# Patient Record
Sex: FEMALE | Race: WHITE | NOT HISPANIC OR LATINO
[De-identification: names, ages, dates, MRNs, and addresses within clinical notes are randomized per-mention and may not be internally consistent; named-entity substitution may affect disease eponyms.]

---

## 2017-02-11 ENCOUNTER — APPOINTMENT (OUTPATIENT)
Dept: OBGYN | Facility: CLINIC | Age: 42
End: 2017-02-11

## 2017-02-11 VITALS
SYSTOLIC BLOOD PRESSURE: 100 MMHG | DIASTOLIC BLOOD PRESSURE: 60 MMHG | BODY MASS INDEX: 21.51 KG/M2 | WEIGHT: 126 LBS | HEIGHT: 64 IN

## 2017-02-11 DIAGNOSIS — N88.3 INCOMPETENCE OF CERVIX UTERI: ICD-10-CM

## 2017-02-13 LAB — HPV HIGH+LOW RISK DNA PNL CVX: NEGATIVE

## 2017-02-16 LAB — CYTOLOGY CVX/VAG DOC THIN PREP: NORMAL

## 2017-02-28 ENCOUNTER — EMERGENCY (EMERGENCY)
Facility: HOSPITAL | Age: 42
LOS: 0 days | Discharge: HOME | End: 2017-02-28
Attending: EMERGENCY MEDICINE | Admitting: EMERGENCY MEDICINE
Payer: COMMERCIAL

## 2017-02-28 VITALS — HEIGHT: 64 IN | WEIGHT: 119.93 LBS

## 2017-02-28 VITALS
DIASTOLIC BLOOD PRESSURE: 72 MMHG | RESPIRATION RATE: 18 BRPM | TEMPERATURE: 98 F | OXYGEN SATURATION: 100 % | HEART RATE: 56 BPM | SYSTOLIC BLOOD PRESSURE: 123 MMHG

## 2017-02-28 DIAGNOSIS — S09.90XA UNSPECIFIED INJURY OF HEAD, INITIAL ENCOUNTER: ICD-10-CM
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
## 2017-02-28 DIAGNOSIS — S06.0X9A CONCUSSION WITH LOSS OF CONSCIOUSNESS OF UNSPECIFIED DURATION, INITIAL ENCOUNTER: ICD-10-CM
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
## 2017-02-28 PROCEDURE — 99284 EMERGENCY DEPT VISIT MOD MDM: CPT

## 2017-02-28 PROCEDURE — 70450 CT HEAD/BRAIN W/O DYE: CPT | Mod: 26

## 2017-02-28 NOTE — ED STATDOCS - OBJECTIVE STATEMENT
40 y/o F presents to ED c/o head injury from Wednesday (6 days ago) while skiing. Pt was wearing a helmet, fell onto her left side. Notes that she gets dizzy with minimal movement, worsening. Also notes photophobia and difficulty with visual focusing. Denies diplopia. Came to ED because sx have been worsening.

## 2017-02-28 NOTE — ED STATDOCS - ENMT, MLM
TM's clear; no excessive cerumen, no blood. Nasal mucosa clear.  Mouth with normal mucosa  Throat has no vesicles, no oropharyngeal exudates and uvula is midline.

## 2017-02-28 NOTE — ED STATDOCS - NS ED MD SCRIBE ATTENDING SCRIBE SECTIONS
HISTORY OF PRESENT ILLNESS/PHYSICAL EXAM/PROGRESS NOTE/CONSULTATIONS/SHIFT CHANGE/PAST MEDICAL/SURGICAL/SOCIAL HISTORY/DISPOSITION/RESULTS/REVIEW OF SYSTEMS

## 2017-02-28 NOTE — ED STATDOCS - CONSTITUTIONAL, MLM
normal... well appearing, well nourished, and in no apparent distress. Head is normocephalic, atraumatic.

## 2017-02-28 NOTE — ED STATDOCS - DETAILS:
I, Alma Rios, performed the initial face to face bedside interview with this patient regarding history of present illness, review of symptoms and relevant past medical, social and family history.  I completed an independent physical examination.  I was the initial provider who evaluated this patient. The history, relevant review of systems, past medical and surgical history, medical decision making, and physical examination was documented by the scribe in my presence and I attest to the accuracy of the documentation.

## 2018-02-24 ENCOUNTER — EMERGENCY (EMERGENCY)
Facility: HOSPITAL | Age: 43
LOS: 0 days | Discharge: HOME | End: 2018-02-24
Attending: EMERGENCY MEDICINE | Admitting: EMERGENCY MEDICINE
Payer: COMMERCIAL

## 2018-02-24 VITALS
RESPIRATION RATE: 17 BRPM | SYSTOLIC BLOOD PRESSURE: 121 MMHG | DIASTOLIC BLOOD PRESSURE: 76 MMHG | TEMPERATURE: 98 F | HEART RATE: 53 BPM | OXYGEN SATURATION: 100 %

## 2018-02-24 VITALS — HEIGHT: 64 IN | WEIGHT: 119.93 LBS
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
## 2018-02-24 DIAGNOSIS — F07.81 POSTCONCUSSIONAL SYNDROME: ICD-10-CM

## 2018-02-24 DIAGNOSIS — S09.90XA UNSPECIFIED INJURY OF HEAD, INITIAL ENCOUNTER: ICD-10-CM
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
## 2018-02-24 PROCEDURE — 99285 EMERGENCY DEPT VISIT HI MDM: CPT

## 2018-02-24 PROCEDURE — 70450 CT HEAD/BRAIN W/O DYE: CPT | Mod: 26

## 2018-02-24 RX ORDER — MECLIZINE HCL 12.5 MG
25 TABLET ORAL ONCE
Qty: 0 | Refills: 0 | Status: COMPLETED | OUTPATIENT
Start: 2018-02-24 | End: 2018-02-24

## 2018-02-24 RX ADMIN — Medication 25 MILLIGRAM(S): at 21:04

## 2018-02-24 NOTE — ED STATDOCS - PROGRESS NOTE DETAILS
CURTIS Farmer:   Patient has been seen, evaluated and orders have been written by the attending in intake. Patient is stable.  I will follow up the results of orders written and I will continue to evaluate/observe the patient.  closed head injury while skiing two days ago.  Now with dizziness, residual headache.  Plan for CT.  Probable post concussive syndrome.  Radha Farmer PA-C Patient is feeling much better, tests/labs reviewed. case discussed with attending. OK to dc home. CURTIS Salinas

## 2018-02-24 NOTE — ED ADULT NURSE NOTE - OBJECTIVE STATEMENT
Patient skiing 2 days ago fell on mountain struck head on ice patient was wearing a helmet and reports that the helmet had a dent.  Patient seen in [place] and checked for concussion but no CT done.  Patient reports feeling pressure on the sides of her head and dizziness.  patient reports momentary loss of consciousness when she fell.

## 2018-02-24 NOTE — ED STATDOCS - NEUROLOGICAL, MLM
sensation is normal and strength is normal. Cranial nerves intact. Gait is normal. Normal muscle strength. Clear speech. A&Ox3.

## 2018-02-24 NOTE — ED STATDOCS - OBJECTIVE STATEMENT
41 y/o female presents to the ED s/p fall while skiing two days ago. Pt states she was skiing down a steep hill, fell forward but hit the back of her head. Helmet was dented. No LOC. Was evaluated at a hospital in [place] but was discharged due to insurance reasons. Pt's  drove pt to [place] but no CT head was done. Pt was asymptomatic at the time. Woke up the next day with dizziness. +pain to site of injury. +HA described as pressure. +pain to back of eyes. No nausea or ear-ringing. No imbalance. No speech changes. Took Tylenol today. No chance of pregnancy.

## 2018-02-28 ENCOUNTER — APPOINTMENT (OUTPATIENT)
Dept: PHYSICAL MEDICINE AND REHAB | Facility: CLINIC | Age: 43
End: 2018-02-28
Payer: COMMERCIAL

## 2018-02-28 VITALS
HEART RATE: 52 BPM | DIASTOLIC BLOOD PRESSURE: 73 MMHG | WEIGHT: 120 LBS | BODY MASS INDEX: 20.49 KG/M2 | HEIGHT: 64 IN | OXYGEN SATURATION: 100 % | SYSTOLIC BLOOD PRESSURE: 124 MMHG | TEMPERATURE: 97.6 F

## 2018-02-28 DIAGNOSIS — G44.319 ACUTE POST-TRAUMATIC HEADACHE, NOT INTRACTABLE: ICD-10-CM

## 2018-02-28 DIAGNOSIS — R42 DIZZINESS AND GIDDINESS: ICD-10-CM

## 2018-02-28 DIAGNOSIS — H53.149 VISUAL DISCOMFORT, UNSPECIFIED: ICD-10-CM

## 2018-02-28 DIAGNOSIS — F40.298 OTHER SPECIFIED PHOBIA: ICD-10-CM

## 2018-02-28 DIAGNOSIS — S06.0X9A CONCUSSION WITH LOSS OF CONSCIOUSNESS OF UNSPECIFIED DURATION, INITIAL ENCOUNTER: ICD-10-CM

## 2018-02-28 DIAGNOSIS — R26.89 OTHER ABNORMALITIES OF GAIT AND MOBILITY: ICD-10-CM

## 2018-02-28 PROCEDURE — 99204 OFFICE O/P NEW MOD 45 MIN: CPT

## 2018-04-10 ENCOUNTER — APPOINTMENT (OUTPATIENT)
Age: 43
End: 2018-04-10

## 2019-02-28 ENCOUNTER — APPOINTMENT (OUTPATIENT)
Dept: OBGYN | Facility: CLINIC | Age: 44
End: 2019-02-28
Payer: COMMERCIAL

## 2019-02-28 VITALS
HEIGHT: 64 IN | BODY MASS INDEX: 21.68 KG/M2 | WEIGHT: 127 LBS | DIASTOLIC BLOOD PRESSURE: 66 MMHG | SYSTOLIC BLOOD PRESSURE: 100 MMHG

## 2019-02-28 DIAGNOSIS — N92.0 EXCESSIVE AND FREQUENT MENSTRUATION WITH REGULAR CYCLE: ICD-10-CM

## 2019-02-28 DIAGNOSIS — Z01.419 ENCOUNTER FOR GYNECOLOGICAL EXAMINATION (GENERAL) (ROUTINE) W/OUT ABNORMAL FINDINGS: ICD-10-CM

## 2019-02-28 DIAGNOSIS — R42 DIZZINESS AND GIDDINESS: ICD-10-CM

## 2019-02-28 PROCEDURE — 99396 PREV VISIT EST AGE 40-64: CPT

## 2019-02-28 PROCEDURE — 99214 OFFICE O/P EST MOD 30 MIN: CPT | Mod: 25

## 2019-02-28 NOTE — HISTORY OF PRESENT ILLNESS
[___ Year(s) Ago] : [unfilled] year(s) ago [Good] : being in good health [Healthy Diet] : a healthy diet [Regular Exercise] : regular exercise [Weight Concerns] : weight concens [Reproductive Age] : is of reproductive age [Contraception] : uses contraception [Sexually Active] : is sexually active [Monogamous] : is monogamous [Male ___] : [unfilled] male

## 2019-03-01 ENCOUNTER — TRANSCRIPTION ENCOUNTER (OUTPATIENT)
Age: 44
End: 2019-03-01

## 2019-03-02 LAB
BASOPHILS # BLD AUTO: 0.07 K/UL
BASOPHILS NFR BLD AUTO: 1 %
EOSINOPHIL # BLD AUTO: 0.18 K/UL
EOSINOPHIL NFR BLD AUTO: 2.7 %
HCT VFR BLD CALC: 43.2 %
HGB BLD-MCNC: 13 G/DL
HPV HIGH+LOW RISK DNA PNL CVX: NOT DETECTED
IMM GRANULOCYTES NFR BLD AUTO: 0.1 %
LYMPHOCYTES # BLD AUTO: 2.66 K/UL
LYMPHOCYTES NFR BLD AUTO: 39.5 %
MAN DIFF?: NORMAL
MCHC RBC-ENTMCNC: 29.1 PG
MCHC RBC-ENTMCNC: 30.1 GM/DL
MCV RBC AUTO: 96.9 FL
MONOCYTES # BLD AUTO: 0.46 K/UL
MONOCYTES NFR BLD AUTO: 6.8 %
NEUTROPHILS # BLD AUTO: 3.35 K/UL
NEUTROPHILS NFR BLD AUTO: 49.9 %
PLATELET # BLD AUTO: 217 K/UL
RBC # BLD: 4.46 M/UL
RBC # FLD: 14 %
WBC # FLD AUTO: 6.73 K/UL

## 2019-03-07 LAB — CYTOLOGY CVX/VAG DOC THIN PREP: NORMAL

## 2019-04-01 PROBLEM — M25.552 HIP PAIN, LEFT: Status: ACTIVE | Noted: 2019-04-01

## 2019-04-03 ENCOUNTER — APPOINTMENT (OUTPATIENT)
Dept: ORTHOPEDIC SURGERY | Facility: CLINIC | Age: 44
End: 2019-04-03
Payer: COMMERCIAL

## 2019-04-03 VITALS
BODY MASS INDEX: 21.17 KG/M2 | WEIGHT: 124 LBS | TEMPERATURE: 98.2 F | SYSTOLIC BLOOD PRESSURE: 115 MMHG | HEART RATE: 56 BPM | DIASTOLIC BLOOD PRESSURE: 73 MMHG | HEIGHT: 64 IN

## 2019-04-03 DIAGNOSIS — M25.552 PAIN IN LEFT HIP: ICD-10-CM

## 2019-04-03 DIAGNOSIS — Z78.9 OTHER SPECIFIED HEALTH STATUS: ICD-10-CM

## 2019-04-03 PROCEDURE — 73502 X-RAY EXAM HIP UNI 2-3 VIEWS: CPT | Mod: LT

## 2019-04-03 PROCEDURE — 99203 OFFICE O/P NEW LOW 30 MIN: CPT

## 2019-04-08 NOTE — ADDENDUM
[FreeTextEntry1] : This note was written by Nohemy White on 04/08/2019 acting as a scribe for FLY MONTEMAYOR

## 2019-04-08 NOTE — HISTORY OF PRESENT ILLNESS
[5] : the ailment interference is 5/10 [de-identified] : The patient comes in today with complaints to her left hip.  She is an avid runner and it has gotten to the point where she is having difficulty running, especially long distances.  The pain can get fairly high.  The patient states the onset/injury occurred on November 4, 2018.  This injury is not work related or due to an automobile accident.  The patient states the pain is intermittent.  The patient describes the pain as sharp and achy.  The patient notes rest makes her symptoms better, while running makes her symptoms worse.  The patient indicates pain is at a level of 5 on a pain scale of 0-10. [] : No

## 2019-04-08 NOTE — DISCUSSION/SUMMARY
[de-identified] : At this time, due to probable labral tear of left hip and because the severity of her complaints, I advised an anti-inflammatory.  I recommended she undergo an MRI.  We will discuss the potential for further intervention.

## 2019-04-08 NOTE — PHYSICAL EXAM
[de-identified] : Right Hip: \par Range of Motion in Degrees:\par 	                                 Claimant:	   Normal:	\par Flexion (Active) 	                 120 	   120-degrees	\par Flexion (Passive)	                 120	   120-degrees	\par Extension (Active)	                 -30	   -30-degrees	\par Extension (Passive)	 -30	   -30-degrees	\par Abduction (Active)	                 45-50	   31-52-[id number]	\par Abduction (Passive)	 45-50	   [id number]	\par Adduction (Active)  	 20-30	   97-91-jrulegr	\par Adduction (Passive)	 20-30	   33-08-[id number]	\par Internal Rotation (Active) 	 35	   35-degrees	\par Internal Rotation (Passive)	 35	   35-degrees	\par External Rotation (Active)	 45	   45-degrees	\par External Rotation (Passive)	 45	   45-degrees	\par \par No tenderness with internal or external rotation or axial load.  No tenderness to palpation over the greater trochanter.  Negative Trendelenburg.  No tenderness with resisted abduction.  No weakness to flexion, extension, abduction or adduction.  No evidence of instability.  No motor or sensory deficits.  2+ DP and PT pulses.  Skin is intact.  No scars, rashes or lesions.  \par \par Left Hip:\par Limited range of motion secondary to pain.  Tenderness with flexion, abduction and external rotation.  No motor or sensory deficits.  Skin is intact.  No scars, rashes or lesions.\par \par \par   [de-identified] : Ambulating with a slightly antalgic to antalgic gait.  Station:  Normal.  [de-identified] : Appearance:  Well-developed, well-nourished female in no acute distress.\par \par   [de-identified] : Radiographs, two to three views of the left hip, show no obvious osseous abnormality.

## 2020-04-29 NOTE — ED STATDOCS - ATTENDING CONTRIBUTION TO CARE
Hadley Blanco is a 37 y.o. female who was seen by synchronous (real-time) audio-video technology on 4/29/2020. Consent: Hadley Blanco, who was seen by synchronous (real-time) audio-video technology, and/or her healthcare decision maker, is aware that this patient-initiated, Telehealth encounter on 4/29/2020 is a billable service, with coverage as determined by her insurance carrier. She is aware that she may receive a bill and has provided verbal consent to proceed: Yes. Assessment & Plan:   Diagnoses and all orders for this visit:    1. Swelling of right eyelid    2. Anxiety    3. Stress          I spent at least 40 minutes on this visit with this established patient. ([id number])    Subjective:   Hadley Blanco is a 37 y.o. female who was seen for Swelling (pt states, no drainage, no eye pain, no blurred vision)    Saw patient via video for right eyelid swelling. Reports that it began almost 2 weeks  days ago and has not gotten any better. Reports that it feels like it has pressure behind the eye. Reports taking the antibiotics drops, but does not see any improvements. Denies any loss of vision or photosensitivity. Reports pain periodically. Denies loss of vision or headaches      Anxiety has gotten better. Has taken the ativan as needed      Denies fever, chills, CP, SOB      Prior to Admission medications    Medication Sig Start Date End Date Taking? Authorizing Provider   gentamicin (GARAMYCIN) 0.3 % ophthalmic solution Administer 2 Drops to right eye three (3) times daily. Administer 2 drops to right eye 3 times a day for 5 days 4/21/20  Yes Fernanda Lerma NP   LORazepam (ATIVAN) 0.5 mg tablet Take 0.5 Tabs by mouth every eight (8) hours as needed for Anxiety.  Max Daily Amount: 0.75 mg. 1/27/20  Yes Fernanda Lerma NP     No Known Allergies    Patient Active Problem List   Diagnosis Code    Thyromegaly E01.0    Thyroid nodule E04.1     Patient Active Problem List    Diagnosis Date Noted    Thyroid nodule 08/03/2017    Thyromegaly 08/29/2016     Current Outpatient Medications   Medication Sig Dispense Refill    gentamicin (GARAMYCIN) 0.3 % ophthalmic solution Administer 2 Drops to right eye three (3) times daily. Administer 2 drops to right eye 3 times a day for 5 days 1 Bottle 0    LORazepam (ATIVAN) 0.5 mg tablet Take 0.5 Tabs by mouth every eight (8) hours as needed for Anxiety. Max Daily Amount: 0.75 mg. 30 Tab 0     No Known Allergies  Past Medical History:   Diagnosis Date    Abnormal Pap smear     Follow-ups normal    Herpes simplex without mention of complication     HX OTHER MEDICAL     Endometriosus     Past Surgical History:   Procedure Laterality Date    HX OTHER SURGICAL  2005    Exploratory  laprascopic for endometriosus     Family History   Problem Relation Age of Onset    Cancer Maternal Grandmother         Breast    No Known Problems Mother     Arthritis-osteo Father     No Known Problems Sister     No Known Problems Brother     No Known Problems Sister      Social History     Tobacco Use    Smoking status: Never Smoker    Smokeless tobacco: Never Used   Substance Use Topics    Alcohol use: Yes     Alcohol/week: 0.0 standard drinks     Comment: Occasionally       Review of Systems   Constitutional: Negative for chills and fever. HENT: Negative. Eyes: Negative for blurred vision, double vision, pain and redness. Eyelid swelling    Respiratory: Negative. Cardiovascular: Negative. Gastrointestinal: Negative for nausea and vomiting. Genitourinary: Negative. Musculoskeletal: Negative. Skin: Negative. Neurological: Negative. Psychiatric/Behavioral: The patient is nervous/anxious.         Objective:   Vital Signs: (As obtained by patient/caregiver at home)  Visit Vitals  [facility] 04/21/2020        [INSTRUCTIONS:  \"[x]\" Indicates a positive item  \"[]\" Indicates a negative item  -- DELETE ALL ITEMS NOT EXAMINED]    Constitutional: [x] Appears well-developed and well-nourished [x] No apparent distress      [] Abnormal -     Mental status: [x] Alert and awake  [x] Oriented to person/place/time [x] Able to follow commands    [] Abnormal -     Eyes:   EOM    [x]  Normal    [x] Abnormal - eye swelling to the right eyelid, no apparent trauma or redness  Sclera  [x]  Normal    [] Abnormal -          Discharge [x]  None visible   [] Abnormal -     HENT: [x] Normocephalic, atraumatic  [] Abnormal -   [x] Mouth/Throat: Mucous membranes are moist    External Ears [x] Normal  [] Abnormal -    Neck: [x] No visualized mass [] Abnormal -     Pulmonary/Chest: [x] Respiratory effort normal   [x] No visualized signs of difficulty breathing or respiratory distress        [] Abnormal -      Musculoskeletal:   [x] Normal gait with no signs of ataxia         [x] Normal range of motion of neck        [] Abnormal -     Neurological:        [x] No Facial Asymmetry (Cranial nerve 7 motor function) (limited exam due to video visit)          [x] No gaze palsy        [] Abnormal -          Skin:        [x] No significant exanthematous lesions or discoloration noted on facial skin         [] Abnormal -            Psychiatric:       [x] Normal Affect [] Abnormal -        [x] No Hallucinations    Other pertinent observable physical exam findings:-        We discussed the expected course, resolution and complications of the diagnosis(es) in detail. Medication risks, benefits, costs, interactions, and alternatives were discussed as indicated. I advised her to contact the office if her condition worsens, changes or fails to improve as anticipated. She expressed understanding with the diagnosis(es) and plan. Oh Borges is a 37 y.o. female who was evaluated by a video visit encounter for concerns as above. Patient identification was verified prior to start of the visit. A caregiver was present when appropriate.  Due to this being a TeleHealth encounter (During RYLAJ-78 public health emergency), evaluation of the following organ systems was limited: Vitals/Constitutional/EENT/Resp/CV/GI//MS/Neuro/Skin/Heme-Lymph-Imm. Pursuant to the emergency declaration under the [facility], Counts include 234 beds at the [facility] waiver authority and the [employer] Act, this Virtual  Visit was conducted, with patient's (and/or legal guardian's) consent, to reduce the patient's risk of exposure to COVID-19 and provide necessary medical care. Services were provided through a video synchronous discussion virtually to substitute for in-person clinic visit. Patient and provider were located at their individual homes. I spent at least 30 minutes with this patient.  Called optometry to get recommendations and make a tele visit     Kristal Sanchez NP Attending Contribution to Care: I, Joslyn Wood, performed the initial face to face bedside interview with this patient regarding history of present illness, review of symptoms and relevant past medical, social and family history.  I completed an independent physical examination.  I was the initial provider who evaluated this patient. I have signed out the follow up of any pending tests (i.e. labs, radiological studies) to the ACP.  I have communicated the patient’s plan of care and disposition with the ACP.

## 2020-12-21 PROBLEM — Z01.419 ENCOUNTER FOR GYNECOLOGICAL EXAMINATION: Status: RESOLVED | Noted: 2017-02-11 | Resolved: 2020-12-21

## 2021-01-04 ENCOUNTER — EMERGENCY (EMERGENCY)
Facility: HOSPITAL | Age: 46
LOS: 0 days | Discharge: HOME | End: 2021-01-04
Attending: EMERGENCY MEDICINE
Payer: COMMERCIAL

## 2021-01-04 VITALS
DIASTOLIC BLOOD PRESSURE: 71 MMHG | HEART RATE: 59 BPM | SYSTOLIC BLOOD PRESSURE: 116 MMHG | TEMPERATURE: 98 F | RESPIRATION RATE: 18 BRPM | OXYGEN SATURATION: 100 %

## 2021-01-04 VITALS — HEIGHT: 64 IN | WEIGHT: 115.96 LBS

## 2021-01-04 DIAGNOSIS — R30.0 DYSURIA: ICD-10-CM

## 2021-01-04 DIAGNOSIS — N39.0 URINARY TRACT INFECTION, SITE NOT SPECIFIED: ICD-10-CM

## 2021-01-04 LAB
APPEARANCE UR: ABNORMAL
BILIRUB UR-MCNC: NEGATIVE — SIGNIFICANT CHANGE UP
COLOR SPEC: SIGNIFICANT CHANGE UP
DIFF PNL FLD: ABNORMAL
GLUCOSE UR QL: NEGATIVE MG/DL — SIGNIFICANT CHANGE UP
KETONES UR-MCNC: ABNORMAL
LEUKOCYTE ESTERASE UR-ACNC: ABNORMAL
NITRITE UR-MCNC: NEGATIVE — SIGNIFICANT CHANGE UP
PH UR: 6.5 — SIGNIFICANT CHANGE UP (ref 5–8)
PROT UR-MCNC: 100 MG/DL
SP GR SPEC: 1.01 — SIGNIFICANT CHANGE UP (ref 1.01–1.02)
UROBILINOGEN FLD QL: NEGATIVE MG/DL — SIGNIFICANT CHANGE UP

## 2021-01-04 PROCEDURE — 87086 URINE CULTURE/COLONY COUNT: CPT

## 2021-01-04 PROCEDURE — 99284 EMERGENCY DEPT VISIT MOD MDM: CPT

## 2021-01-04 PROCEDURE — 87186 SC STD MICRODIL/AGAR DIL: CPT

## 2021-01-04 PROCEDURE — 81001 URINALYSIS AUTO W/SCOPE: CPT

## 2021-01-04 PROCEDURE — 99283 EMERGENCY DEPT VISIT LOW MDM: CPT

## 2021-01-04 RX ORDER — CEPHALEXIN 500 MG
1 CAPSULE ORAL
Qty: 14 | Refills: 0
Start: 2021-01-04 | End: 2021-01-10

## 2021-01-04 RX ORDER — PHENAZOPYRIDINE HCL 100 MG
1 TABLET ORAL
Qty: 6 | Refills: 0
Start: 2021-01-04

## 2021-01-04 RX ORDER — CEPHALEXIN 500 MG
500 CAPSULE ORAL ONCE
Refills: 0 | Status: COMPLETED | OUTPATIENT
Start: 2021-01-04 | End: 2021-01-04

## 2021-01-04 RX ORDER — PHENAZOPYRIDINE HCL 100 MG
100 TABLET ORAL ONCE
Refills: 0 | Status: COMPLETED | OUTPATIENT
Start: 2021-01-04 | End: 2021-01-04

## 2021-01-04 RX ADMIN — Medication 500 MILLIGRAM(S): at 22:15

## 2021-01-04 RX ADMIN — Medication 100 MILLIGRAM(S): at 22:15

## 2021-01-04 NOTE — ED STATDOCS - PROGRESS NOTE DETAILS
patient seen and evaluated.  +UTI.  reviewed management, return precautions, PMD f/u -Flores Renteria PA-C

## 2021-01-04 NOTE — ED STATDOCS - OBJECTIVE STATEMENT
44 y/o female with no significant PMHx presents to the ED c/o dysuria x3 days. Pt reports urinary frequency, symptoms worsening yesterday, and episode of hematuria today. Denies fever, back pain. Notes Hx of hematuria and UTI in the past. No other complaints at this time. NKDA.

## 2021-01-04 NOTE — ED STATDOCS - NS_ ATTENDINGSCRIBEDETAILS _ED_A_ED_FT
I, Oneil Peace MD,  performed the initial face to face bedside interview with this patient regarding history of present illness, review of symptoms and relevant past medical, social and family history.  I completed an independent physical examination.    The history, relevant review of systems, past medical and surgical history, medical decision making, and physical examination was documented by the scribe in my presence and I attest to the accuracy of the documentation.

## 2021-01-04 NOTE — ED STATDOCS - PATIENT PORTAL LINK FT
You can access the FollowMyHealth Patient Portal offered by [facility] by registering at the following website: [email or web address]. By joining [employer]’s FollowMyHealth portal, you will also be able to view your health information using other applications (apps) compatible with our system.

## 2021-01-04 NOTE — ED STATDOCS - MDM ORDERS SUBMITTED SELECTION
Labs/Medications Quality 226: Preventive Care And Screening: Tobacco Use: Screening And Cessation Intervention: Patient screened for tobacco and never smoked Quality 431: Preventive Care And Screening: Unhealthy Alcohol Use - Screening: Patient screened for unhealthy alcohol use using a single question and scores 2 or greater episodes per year and brief intervention occurred Quality 110: Preventive Care And Screening: Influenza Immunization: Influenza Immunization previously received during influenza season Detail Level: Detailed Quality 111:Pneumonia Vaccination Status For Older Adults: Pneumococcal Vaccination not Administered or Previously Received, Reason not Otherwise Specified

## 2021-02-17 ENCOUNTER — EMERGENCY (EMERGENCY)
Facility: HOSPITAL | Age: 46
LOS: 0 days | Discharge: HOME | End: 2021-02-17
Attending: EMERGENCY MEDICINE
Payer: COMMERCIAL

## 2021-02-17 VITALS
OXYGEN SATURATION: 100 % | SYSTOLIC BLOOD PRESSURE: 124 MMHG | RESPIRATION RATE: 17 BRPM | DIASTOLIC BLOOD PRESSURE: 84 MMHG | HEART RATE: 70 BPM | TEMPERATURE: 98 F

## 2021-02-17 VITALS — WEIGHT: 115.08 LBS | HEIGHT: 64 IN

## 2021-02-17 DIAGNOSIS — R11.0 NAUSEA: ICD-10-CM

## 2021-02-17 DIAGNOSIS — R42 DIZZINESS AND GIDDINESS: ICD-10-CM

## 2021-02-17 DIAGNOSIS — R51.9 HEADACHE, UNSPECIFIED: ICD-10-CM

## 2021-02-17 LAB
ALBUMIN SERPL ELPH-MCNC: 3.9 G/DL — SIGNIFICANT CHANGE UP (ref 3.3–5)
ALP SERPL-CCNC: 87 U/L — SIGNIFICANT CHANGE UP (ref 40–120)
ALT FLD-CCNC: 25 U/L — SIGNIFICANT CHANGE UP (ref 12–78)
ANION GAP SERPL CALC-SCNC: 6 MMOL/L — SIGNIFICANT CHANGE UP (ref 5–17)
AST SERPL-CCNC: 22 U/L — SIGNIFICANT CHANGE UP (ref 15–37)
BASOPHILS # BLD AUTO: 0.07 K/UL — SIGNIFICANT CHANGE UP (ref 0–0.2)
BASOPHILS NFR BLD AUTO: 0.6 % — SIGNIFICANT CHANGE UP (ref 0–2)
BILIRUB SERPL-MCNC: 0.2 MG/DL — SIGNIFICANT CHANGE UP (ref 0.2–1.2)
BUN SERPL-MCNC: 19 MG/DL — SIGNIFICANT CHANGE UP (ref 7–23)
CALCIUM SERPL-MCNC: 9 MG/DL — SIGNIFICANT CHANGE UP (ref 8.5–10.1)
CHLORIDE SERPL-SCNC: 110 MMOL/L — HIGH (ref 96–108)
CO2 SERPL-SCNC: 27 MMOL/L — SIGNIFICANT CHANGE UP (ref 22–31)
CREAT SERPL-MCNC: 0.72 MG/DL — SIGNIFICANT CHANGE UP (ref 0.5–1.3)
EOSINOPHIL # BLD AUTO: 0.19 K/UL — SIGNIFICANT CHANGE UP (ref 0–0.5)
EOSINOPHIL NFR BLD AUTO: 1.7 % — SIGNIFICANT CHANGE UP (ref 0–6)
GLUCOSE SERPL-MCNC: 95 MG/DL — SIGNIFICANT CHANGE UP (ref 70–99)
HCT VFR BLD CALC: 40.2 % — SIGNIFICANT CHANGE UP (ref 34.5–45)
HGB BLD-MCNC: 12.9 G/DL — SIGNIFICANT CHANGE UP (ref 11.5–15.5)
IMM GRANULOCYTES NFR BLD AUTO: 0.2 % — SIGNIFICANT CHANGE UP (ref 0–1.5)
LYMPHOCYTES # BLD AUTO: 27.9 % — SIGNIFICANT CHANGE UP (ref 13–44)
LYMPHOCYTES # BLD AUTO: 3.21 K/UL — SIGNIFICANT CHANGE UP (ref 1–3.3)
MCHC RBC-ENTMCNC: 27.7 PG — SIGNIFICANT CHANGE UP (ref 27–34)
MCHC RBC-ENTMCNC: 32.1 GM/DL — SIGNIFICANT CHANGE UP (ref 32–36)
MCV RBC AUTO: 86.3 FL — SIGNIFICANT CHANGE UP (ref 80–100)
MONOCYTES # BLD AUTO: 0.78 K/UL — SIGNIFICANT CHANGE UP (ref 0–0.9)
MONOCYTES NFR BLD AUTO: 6.8 % — SIGNIFICANT CHANGE UP (ref 2–14)
NEUTROPHILS # BLD AUTO: 7.24 K/UL — SIGNIFICANT CHANGE UP (ref 1.8–7.4)
NEUTROPHILS NFR BLD AUTO: 62.8 % — SIGNIFICANT CHANGE UP (ref 43–77)
PLATELET # BLD AUTO: 231 K/UL — SIGNIFICANT CHANGE UP (ref 150–400)
POTASSIUM SERPL-MCNC: 4 MMOL/L — SIGNIFICANT CHANGE UP (ref 3.5–5.3)
POTASSIUM SERPL-SCNC: 4 MMOL/L — SIGNIFICANT CHANGE UP (ref 3.5–5.3)
PROT SERPL-MCNC: 6.9 GM/DL — SIGNIFICANT CHANGE UP (ref 6–8.3)
RBC # BLD: 4.66 M/UL — SIGNIFICANT CHANGE UP (ref 3.8–5.2)
RBC # FLD: 14.3 % — SIGNIFICANT CHANGE UP (ref 10.3–14.5)
SODIUM SERPL-SCNC: 143 MMOL/L — SIGNIFICANT CHANGE UP (ref 135–145)
WBC # BLD: 11.51 K/UL — HIGH (ref 3.8–10.5)
WBC # FLD AUTO: 11.51 K/UL — HIGH (ref 3.8–10.5)

## 2021-02-17 PROCEDURE — 70450 CT HEAD/BRAIN W/O DYE: CPT

## 2021-02-17 PROCEDURE — 99284 EMERGENCY DEPT VISIT MOD MDM: CPT | Mod: 25

## 2021-02-17 PROCEDURE — 99284 EMERGENCY DEPT VISIT MOD MDM: CPT

## 2021-02-17 PROCEDURE — 80053 COMPREHEN METABOLIC PANEL: CPT

## 2021-02-17 PROCEDURE — 85025 COMPLETE CBC W/AUTO DIFF WBC: CPT

## 2021-02-17 PROCEDURE — 36415 COLL VENOUS BLD VENIPUNCTURE: CPT

## 2021-02-17 PROCEDURE — 70450 CT HEAD/BRAIN W/O DYE: CPT | Mod: 26

## 2021-02-17 PROCEDURE — 96374 THER/PROPH/DIAG INJ IV PUSH: CPT

## 2021-02-17 RX ORDER — MECLIZINE HCL 12.5 MG
1 TABLET ORAL
Qty: 30 | Refills: 0
Start: 2021-02-17 | End: 2021-02-26

## 2021-02-17 RX ORDER — MECLIZINE HCL 12.5 MG
25 TABLET ORAL ONCE
Refills: 0 | Status: COMPLETED | OUTPATIENT
Start: 2021-02-17 | End: 2021-02-17

## 2021-02-17 RX ORDER — SODIUM CHLORIDE 9 MG/ML
1000 INJECTION INTRAMUSCULAR; INTRAVENOUS; SUBCUTANEOUS ONCE
Refills: 0 | Status: COMPLETED | OUTPATIENT
Start: 2021-02-17 | End: 2021-02-17

## 2021-02-17 RX ORDER — KETOROLAC TROMETHAMINE 30 MG/ML
30 SYRINGE (ML) INJECTION ONCE
Refills: 0 | Status: DISCONTINUED | OUTPATIENT
Start: 2021-02-17 | End: 2021-02-17

## 2021-02-17 RX ORDER — ACETAMINOPHEN 500 MG
650 TABLET ORAL ONCE
Refills: 0 | Status: COMPLETED | OUTPATIENT
Start: 2021-02-17 | End: 2021-02-17

## 2021-02-17 RX ADMIN — Medication 25 MILLIGRAM(S): at 18:50

## 2021-02-17 RX ADMIN — Medication 30 MILLIGRAM(S): at 19:53

## 2021-02-17 RX ADMIN — Medication 650 MILLIGRAM(S): at 18:50

## 2021-02-17 RX ADMIN — SODIUM CHLORIDE 1000 MILLILITER(S): 9 INJECTION INTRAMUSCULAR; INTRAVENOUS; SUBCUTANEOUS at 18:50

## 2021-02-17 NOTE — ED STATDOCS - NEUROLOGICAL, MLM
sensation is normal and strength is normal. sensation is normal and strength is normal. Ambulates well, no ataxia.

## 2021-02-17 NOTE — ED STATDOCS - PATIENT PORTAL LINK FT
You can access the FollowMyHealth Patient Portal offered by [facility] by registering at the following website: [email or web address]. By joining [employer]’s FollowMyHealth portal, you will also be able to view your health information using other applications (apps) compatible with our system.

## 2021-02-17 NOTE — ED ADULT TRIAGE NOTE - CHIEF COMPLAINT QUOTE
left sided headache and dizziness x 12 days. denies fall or injury. + intermittent nausea and light sensitivity. Denies vomiting, fever/chills. hx concussion.

## 2021-02-17 NOTE — ED ADULT NURSE NOTE - OBJECTIVE STATEMENT
Pt came with with c/o left sided headache that started about two weeks ago. Pt states it started when she was running. Pt states pain is 4/10 at rest and 7/10 with activity. Pain is associated with photophobia and dizziness. Denies LOC, head trauma, nausea or vomiting.

## 2021-02-17 NOTE — ED STATDOCS - PROGRESS NOTE DETAILS
labs WNL, CT head with no acute findings, pt feeling much better will d/c home with meclizine outpt f/uw ith PMD and ENT pt agreeable to d/c and plan of care, all questions answered, return precautions given  Jade Lepe PA-C

## 2021-12-04 ENCOUNTER — EMERGENCY (EMERGENCY)
Facility: HOSPITAL | Age: 46
LOS: 0 days | Discharge: HOME | End: 2021-12-04
Attending: EMERGENCY MEDICINE
Payer: COMMERCIAL

## 2021-12-04 VITALS — HEART RATE: 55 BPM | SYSTOLIC BLOOD PRESSURE: 117 MMHG | DIASTOLIC BLOOD PRESSURE: 71 MMHG

## 2021-12-04 VITALS — HEIGHT: 64 IN | WEIGHT: 115.08 LBS

## 2021-12-04 DIAGNOSIS — R09.81 NASAL CONGESTION: ICD-10-CM

## 2021-12-04 DIAGNOSIS — R53.83 OTHER FATIGUE: ICD-10-CM

## 2021-12-04 DIAGNOSIS — B97.89 OTHER VIRAL AGENTS AS THE CAUSE OF DISEASES CLASSIFIED ELSEWHERE: ICD-10-CM

## 2021-12-04 DIAGNOSIS — J06.9 ACUTE UPPER RESPIRATORY INFECTION, UNSPECIFIED: ICD-10-CM

## 2021-12-04 DIAGNOSIS — R06.7 SNEEZING: ICD-10-CM

## 2021-12-04 PROCEDURE — U0005: CPT

## 2021-12-04 PROCEDURE — 99284 EMERGENCY DEPT VISIT MOD MDM: CPT

## 2021-12-04 PROCEDURE — 99282 EMERGENCY DEPT VISIT SF MDM: CPT

## 2021-12-04 PROCEDURE — U0003: CPT

## 2021-12-04 NOTE — ED ADULT TRIAGE NOTE - CHIEF COMPLAINT QUOTE
pt presents to ED with c/o COVID exposure, coworker tested +. pt last saw sick contact on Tuesday. pt endorses sneezing, congestion, chills, lightheadedness. pt denies loss of taste or smell. unsure if she is afebrile, no at home temperatures taken. pt denies SOB or CP. SaO2 100% in triage. no respiratory distress noted.

## 2021-12-04 NOTE — ED STATDOCS - CARE PROVIDER_API CALL
Elli Sow  [facility]  [address], [address]  [place] [address]  Phone: [phone]  Fax: [phone]  Follow Up Time:

## 2021-12-04 NOTE — ED STATDOCS - PROGRESS NOTE DETAILS
Patient seen and evaluated in intake with ED Attending,  ED attending note and orders reviewed, will continue with patient follow up and care -Deepika Brantley PA-C

## 2021-12-04 NOTE — ED STATDOCS - ATTENDING CONTRIBUTION TO CARE
Attending Contribution to Care: I, Joslyn Wood, performed the initial face to face bedside interview with this patient regarding history of present illness, review of symptoms and relevant past medical, social and family history.  I completed an independent physical examination.  I was the initial provider who evaluated this patient. I have signed out the follow up of any pending tests (i.e. labs, radiological studies) to the ACP.  I have communicated the patient’s plan of care and disposition with the ACP.

## 2021-12-04 NOTE — ED STATDOCS - NSFOLLOWUPINSTRUCTIONS_ED_ALL_ED_FT
Take tylenol 650mg every 4-6 hours as needed for fever, pain, headache.   Keep hydrated.  Rest.  Isolate until you have your Covid results.     Viral Respiratory Infection  A viral respiratory infection is an illness that affects parts of the body used for breathing, like the lungs, nose, and throat. It is caused by a germ called a virus.    ImageSome examples of this kind of infection are:    A cold.  The flu (influenza).  A respiratory syncytial virus (RSV) infection.    How do I know if I have this infection?  Most of the time this infection causes:    A stuffy or runny nose.  Yellow or green fluid in the nose.  A cough.  Sneezing.  Tiredness (fatigue).  Achy muscles.  A sore throat.  Sweating or chills.  A fever.  A headache.    How is this infection treated?  If the flu is diagnosed early, it may be treated with an antiviral medicine. This medicine shortens the length of time a person has symptoms. Symptoms may be treated with over-the-counter and prescription medicines, such as:    Expectorants. These make it easier to cough up mucus.  Decongestant nasal sprays.    Doctors do not prescribe antibiotic medicines for viral infections. They do not work with this kind of infection.    How do I know if I should stay home?  To keep others from getting sick, stay home if you have:    A fever.  A lasting cough.  A sore throat.  A runny nose.  Sneezing.  Muscles aches.  Headaches.  Tiredness.  Weakness.  Chills.  Sweating.  An upset stomach (nausea).    Follow these instructions at home:  Rest as much as possible.  Take over-the-counter and prescription medicines only as told by your doctor.  Drink enough fluid to keep your pee (urine) clear or pale yellow.  Gargle with salt water. Do this 3–4 times per day or as needed. To make a salt–water mixture, dissolve ½–1 tsp of salt in 1 cup of warm water. Make sure the salt dissolves all the way.  Use nose drops made from salt water. This helps with stuffiness (congestion). It also helps soften the skin around your nose.  Do not drink alcohol.  Do not use tobacco products, including cigarettes, chewing tobacco, and e-cigarettes. If you need help quitting, ask your doctor.  Get help if:  Your symptoms last for 10 days or longer.  Your symptoms get worse over time.  You have a fever.  You have very bad pain in your face or forehead.  Parts of your jaw or neck become very swollen.  Get help right away if:  You feel pain or pressure in your chest.  You have shortness of breath.  You faint or feel like you will faint.  You keep throwing up (vomiting).  You feel confused.  This information is not intended to replace advice given to you by your health care provider. Make sure you discuss any questions you have with your health care provider.

## 2021-12-04 NOTE — ED STATDOCS - CLINICAL SUMMARY MEDICAL DECISION MAKING FREE TEXT BOX
Young healthy adult female with URI; likely viral. Covid PCR to be sent.  Patient to isolate and will treat symptoms with tylenol and mucinex at home.

## 2021-12-04 NOTE — ED STATDOCS - ENMT, MLM
Nasal mucosa clear.  Mouth with normal mucosa  Throat has no vesicles, no oropharyngeal exudates and uvula is midline. clear mucus in nose, erythema in distal portion of nose. Mouth with normal mucosa  Throat has no vesicles, no oropharyngeal exudates and uvula is midline.

## 2021-12-04 NOTE — ED STATDOCS - PATIENT PORTAL LINK FT
You can access the FollowMyHealth Patient Portal offered by [facility] by registering at the following website: [email or web address]. By joining [employer]’s FollowMyHealth portal, you will also be able to view your health information using other applications (apps) compatible with our system.

## 2021-12-04 NOTE — ED ADULT NURSE NOTE - OBJECTIVE STATEMENT
46 year old male with no pertinent PMHx presents with sneezing, nasal congestion, and fatigue x last night. Pt states that her coworker who sits behind her tested positive for COVID 5 days ago. She last interacted with that worker 5-6 days ago. No other sick contacts at home. Pt has to run a race in the morning and is afraid of exposing other people. Pt came to request covid testing. Denies fever. Took Mucinex today with some relief. Has a pulse ox at home but hasn't used it yet. COVID vaccinated Pfizer in 1/2021 and 2/2021. Denies chest pain or SOB. No other injuries or complaints.

## 2021-12-04 NOTE — ED STATDOCS - OBJECTIVE STATEMENT
46 year old male with no pertinent PMHx 46 year old male with no pertinent PMHx presents with sneezing, nasal congestion, and fatigue x last night. Pt states that her coworker who sits behind her tested positive for COVID 5 days ago. She last interacted with that worker 5-6 days ago. No other sick contacts at home. Pt has to run a race in the morning and is afraid of exposing other people. Pt came to request covid testing. Denies fever. Took Mucinex today with some relief. Has a pulse ox at home but hasn't used it yet. COVID vaccinated Pfizer in 1/2021 and 2/2021. Denies chest pain or SOB. No other injuries or complaints.

## 2021-12-04 NOTE — ED ADULT NURSE NOTE - NS ED NURSE LEVEL OF CONSCIOUSNESS AFFECT
No Show Note/Documentation    Patient: Yamel Shah  Date of Session: 11/27/2019  Diagnosis:   1. Pharyngoesophageal dysphagia       MRN: [id number]    Yamel Shah did not attend her scheduled therapy appointment today. She did not call to cancel nor reschedule. This is the 1st appointment that she has not attended. Next appointment is scheduled for 12/2/19 and will follow up with patient at that time. No charges have been posted today.       SONIA Sellers., CCC-SLP, CBIS  Speech-Language Pathologist  11/27/2019     Calm/Appropriate

## 2021-12-05 LAB — SARS-COV-2 RNA SPEC QL NAA+PROBE: SIGNIFICANT CHANGE UP

## 2021-12-12 ENCOUNTER — INPATIENT (INPATIENT)
Facility: HOSPITAL | Age: 46
LOS: 0 days | Discharge: HOME | DRG: 316 | End: 2021-12-13
Attending: HOSPITALIST | Admitting: INTERNAL MEDICINE
Payer: COMMERCIAL

## 2021-12-12 VITALS — WEIGHT: 115.08 LBS | HEIGHT: 64 IN

## 2021-12-12 DIAGNOSIS — R07.9 CHEST PAIN, UNSPECIFIED: ICD-10-CM

## 2021-12-12 LAB
ALBUMIN SERPL ELPH-MCNC: 3.8 G/DL — SIGNIFICANT CHANGE UP (ref 3.3–5)
ALP SERPL-CCNC: 80 U/L — SIGNIFICANT CHANGE UP (ref 40–120)
ALT FLD-CCNC: 46 U/L — SIGNIFICANT CHANGE UP (ref 12–78)
ANION GAP SERPL CALC-SCNC: 5 MMOL/L — SIGNIFICANT CHANGE UP (ref 5–17)
APPEARANCE UR: CLEAR — SIGNIFICANT CHANGE UP
APTT BLD: 29.7 SEC — SIGNIFICANT CHANGE UP (ref 27.5–35.5)
AST SERPL-CCNC: 42 U/L — HIGH (ref 15–37)
BACTERIA # UR AUTO: ABNORMAL
BASOPHILS # BLD AUTO: 0.07 K/UL — SIGNIFICANT CHANGE UP (ref 0–0.2)
BASOPHILS NFR BLD AUTO: 0.9 % — SIGNIFICANT CHANGE UP (ref 0–2)
BILIRUB SERPL-MCNC: 0.3 MG/DL — SIGNIFICANT CHANGE UP (ref 0.2–1.2)
BILIRUB UR-MCNC: NEGATIVE — SIGNIFICANT CHANGE UP
BLD GP AB SCN SERPL QL: SIGNIFICANT CHANGE UP
BUN SERPL-MCNC: 16 MG/DL — SIGNIFICANT CHANGE UP (ref 7–23)
CALCIUM SERPL-MCNC: 8.6 MG/DL — SIGNIFICANT CHANGE UP (ref 8.5–10.1)
CHLORIDE SERPL-SCNC: 110 MMOL/L — HIGH (ref 96–108)
CO2 SERPL-SCNC: 27 MMOL/L — SIGNIFICANT CHANGE UP (ref 22–31)
COLOR SPEC: YELLOW — SIGNIFICANT CHANGE UP
CREAT SERPL-MCNC: 0.74 MG/DL — SIGNIFICANT CHANGE UP (ref 0.5–1.3)
DIFF PNL FLD: ABNORMAL
EOSINOPHIL # BLD AUTO: 0.13 K/UL — SIGNIFICANT CHANGE UP (ref 0–0.5)
EOSINOPHIL NFR BLD AUTO: 1.6 % — SIGNIFICANT CHANGE UP (ref 0–6)
EPI CELLS # UR: SIGNIFICANT CHANGE UP
GLUCOSE SERPL-MCNC: 104 MG/DL — HIGH (ref 70–99)
GLUCOSE UR QL: NEGATIVE MG/DL — SIGNIFICANT CHANGE UP
HCG SERPL-ACNC: <1 MIU/ML — SIGNIFICANT CHANGE UP
HCT VFR BLD CALC: 37.6 % — SIGNIFICANT CHANGE UP (ref 34.5–45)
HGB BLD-MCNC: 12 G/DL — SIGNIFICANT CHANGE UP (ref 11.5–15.5)
IMM GRANULOCYTES NFR BLD AUTO: 0.1 % — SIGNIFICANT CHANGE UP (ref 0–1.5)
INR BLD: 0.95 RATIO — SIGNIFICANT CHANGE UP (ref 0.88–1.16)
KETONES UR-MCNC: NEGATIVE — SIGNIFICANT CHANGE UP
LEUKOCYTE ESTERASE UR-ACNC: NEGATIVE — SIGNIFICANT CHANGE UP
LIDOCAIN IGE QN: 101 U/L — SIGNIFICANT CHANGE UP (ref 73–393)
LYMPHOCYTES # BLD AUTO: 2.65 K/UL — SIGNIFICANT CHANGE UP (ref 1–3.3)
LYMPHOCYTES # BLD AUTO: 33.2 % — SIGNIFICANT CHANGE UP (ref 13–44)
MCHC RBC-ENTMCNC: 28.2 PG — SIGNIFICANT CHANGE UP (ref 27–34)
MCHC RBC-ENTMCNC: 31.9 GM/DL — LOW (ref 32–36)
MCV RBC AUTO: 88.5 FL — SIGNIFICANT CHANGE UP (ref 80–100)
MONOCYTES # BLD AUTO: 0.47 K/UL — SIGNIFICANT CHANGE UP (ref 0–0.9)
MONOCYTES NFR BLD AUTO: 5.9 % — SIGNIFICANT CHANGE UP (ref 2–14)
NEUTROPHILS # BLD AUTO: 4.65 K/UL — SIGNIFICANT CHANGE UP (ref 1.8–7.4)
NEUTROPHILS NFR BLD AUTO: 58.3 % — SIGNIFICANT CHANGE UP (ref 43–77)
NITRITE UR-MCNC: NEGATIVE — SIGNIFICANT CHANGE UP
PH UR: 6 — SIGNIFICANT CHANGE UP (ref 5–8)
PLATELET # BLD AUTO: 210 K/UL — SIGNIFICANT CHANGE UP (ref 150–400)
POTASSIUM SERPL-MCNC: 4 MMOL/L — SIGNIFICANT CHANGE UP (ref 3.5–5.3)
POTASSIUM SERPL-SCNC: 4 MMOL/L — SIGNIFICANT CHANGE UP (ref 3.5–5.3)
PROT SERPL-MCNC: 6.8 GM/DL — SIGNIFICANT CHANGE UP (ref 6–8.3)
PROT UR-MCNC: NEGATIVE MG/DL — SIGNIFICANT CHANGE UP
PROTHROM AB SERPL-ACNC: 11.1 SEC — SIGNIFICANT CHANGE UP (ref 10.6–13.6)
RBC # BLD: 4.25 M/UL — SIGNIFICANT CHANGE UP (ref 3.8–5.2)
RBC # FLD: 14.6 % — HIGH (ref 10.3–14.5)
RBC CASTS # UR COMP ASSIST: SIGNIFICANT CHANGE UP /HPF (ref 0–4)
SODIUM SERPL-SCNC: 142 MMOL/L — SIGNIFICANT CHANGE UP (ref 135–145)
SP GR SPEC: 1.01 — SIGNIFICANT CHANGE UP (ref 1.01–1.02)
TROPONIN I, HIGH SENSITIVITY RESULT: 105.78 NG/L — HIGH
TROPONIN I, HIGH SENSITIVITY RESULT: 99.9 NG/L — HIGH
UROBILINOGEN FLD QL: NEGATIVE MG/DL — SIGNIFICANT CHANGE UP
WBC # BLD: 7.98 K/UL — SIGNIFICANT CHANGE UP (ref 3.8–10.5)
WBC # FLD AUTO: 7.98 K/UL — SIGNIFICANT CHANGE UP (ref 3.8–10.5)
WBC UR QL: SIGNIFICANT CHANGE UP

## 2021-12-12 PROCEDURE — 71101 X-RAY EXAM UNILAT RIBS/CHEST: CPT | Mod: 26,LT

## 2021-12-12 PROCEDURE — 85025 COMPLETE CBC W/AUTO DIFF WBC: CPT

## 2021-12-12 PROCEDURE — 84484 ASSAY OF TROPONIN QUANT: CPT

## 2021-12-12 PROCEDURE — 84443 ASSAY THYROID STIM HORMONE: CPT

## 2021-12-12 PROCEDURE — 80053 COMPREHEN METABOLIC PANEL: CPT

## 2021-12-12 PROCEDURE — 85730 THROMBOPLASTIN TIME PARTIAL: CPT

## 2021-12-12 PROCEDURE — 99222 1ST HOSP IP/OBS MODERATE 55: CPT

## 2021-12-12 PROCEDURE — 36415 COLL VENOUS BLD VENIPUNCTURE: CPT

## 2021-12-12 PROCEDURE — 93010 ELECTROCARDIOGRAM REPORT: CPT

## 2021-12-12 PROCEDURE — 86617 LYME DISEASE ANTIBODY: CPT

## 2021-12-12 PROCEDURE — 80061 LIPID PANEL: CPT

## 2021-12-12 PROCEDURE — 93306 TTE W/DOPPLER COMPLETE: CPT

## 2021-12-12 PROCEDURE — 86140 C-REACTIVE PROTEIN: CPT

## 2021-12-12 PROCEDURE — 85610 PROTHROMBIN TIME: CPT

## 2021-12-12 PROCEDURE — 85652 RBC SED RATE AUTOMATED: CPT

## 2021-12-12 PROCEDURE — 71260 CT THORAX DX C+: CPT | Mod: 26,MA

## 2021-12-12 PROCEDURE — 74177 CT ABD & PELVIS W/CONTRAST: CPT | Mod: 26,MA

## 2021-12-12 PROCEDURE — G0378: CPT

## 2021-12-12 PROCEDURE — 86618 LYME DISEASE ANTIBODY: CPT

## 2021-12-12 PROCEDURE — 83036 HEMOGLOBIN GLYCOSYLATED A1C: CPT

## 2021-12-12 PROCEDURE — 99285 EMERGENCY DEPT VISIT HI MDM: CPT

## 2021-12-12 RX ORDER — SODIUM CHLORIDE 9 MG/ML
1000 INJECTION INTRAMUSCULAR; INTRAVENOUS; SUBCUTANEOUS ONCE
Refills: 0 | Status: COMPLETED | OUTPATIENT
Start: 2021-12-12 | End: 2021-12-12

## 2021-12-12 RX ORDER — ACETAMINOPHEN 500 MG
650 TABLET ORAL EVERY 6 HOURS
Refills: 0 | Status: DISCONTINUED | OUTPATIENT
Start: 2021-12-12 | End: 2021-12-12

## 2021-12-12 RX ORDER — ACETAMINOPHEN 500 MG
650 TABLET ORAL EVERY 6 HOURS
Refills: 0 | Status: DISCONTINUED | OUTPATIENT
Start: 2021-12-12 | End: 2021-12-13

## 2021-12-12 RX ORDER — LIDOCAINE 4 G/100G
1 CREAM TOPICAL ONCE
Refills: 0 | Status: COMPLETED | OUTPATIENT
Start: 2021-12-12 | End: 2021-12-12

## 2021-12-12 RX ORDER — ACETAMINOPHEN 500 MG
650 TABLET ORAL ONCE
Refills: 0 | Status: COMPLETED | OUTPATIENT
Start: 2021-12-12 | End: 2021-12-12

## 2021-12-12 RX ORDER — ASPIRIN/CALCIUM CARB/MAGNESIUM 324 MG
324 TABLET ORAL ONCE
Refills: 0 | Status: COMPLETED | OUTPATIENT
Start: 2021-12-12 | End: 2021-12-12

## 2021-12-12 RX ADMIN — Medication 500 MILLIGRAM(S): at 17:06

## 2021-12-12 RX ADMIN — Medication 650 MILLIGRAM(S): at 17:06

## 2021-12-12 RX ADMIN — LIDOCAINE 1 PATCH: 4 CREAM TOPICAL at 18:26

## 2021-12-12 RX ADMIN — Medication 324 MILLIGRAM(S): at 22:08

## 2021-12-12 RX ADMIN — SODIUM CHLORIDE 1000 MILLILITER(S): 9 INJECTION INTRAMUSCULAR; INTRAVENOUS; SUBCUTANEOUS at 18:26

## 2021-12-12 NOTE — ED STATDOCS - OBJECTIVE STATEMENT
45 y/o F with no significant PMHx  presents to the ED c/o L sided CP / L sided rib pain radiating to the back  in the setting of a trip and fall over the curb 1 week ago.  Pt states that she does not think she hit her head. No LOC. States that after the fall she felt very dizzy and sat on a bench. Took Motrin today around 11 AM today with mild relief. Pt states that she felt nauseous this morning. No vomiting.  NKDA.

## 2021-12-12 NOTE — ED ADULT TRIAGE NOTE - CHIEF COMPLAINT QUOTE
c/o left chest pain and rib pain s/p trip and fall 6 days ago, c/o left elbow injury, patient reports exacerbating pain today while leaning on elbow, pain worse today, took motrin today  HX: denies

## 2021-12-12 NOTE — H&P ADULT - ASSESSMENT
45 y/o F w/ PMH of bradycardia, p/w CP.    *Chest pain w/ elevated troponins   -Possibly cardiac contusion?  -Trend Troponins  -ASA  -Cardio consult  -Tele monitoring  -Echo     *Bradycardia  -Cardio consult  -Tele monitoring  -Check TSH / Lyme panel     *Mild non-specific periportal edema  -F/u outpatient GI     *DVT ppx  -SCDs  45 y/o F w/ PMH of bradycardia, p/w CP.    *Chest pain w/ elevated troponins   -Possibly cardiac contusion?  -Trend Troponins  -ASA  -Cardio consult  -Tele monitoring  -Echo   -EKG reviewed by Dr. Bradshaw     *Bradycardia  -Cardio consult  -Tele monitoring  -Check TSH / Lyme panel     *Mild non-specific periportal edema  -F/u outpatient GI     *DVT ppx  -SCDs     IMPROVE VTE Individual Risk Assessment    RISK                                                                Points    [  ] Previous VTE                                                  3    [  ] Thrombophilia                                               2    [  ] Lower limb paralysis                                      2        (unable to hold up >15 seconds)      [  ] Current Cancer                                              2         (within 6 months)    [  ] Immobilization > 24 hrs                                1    [  ] ICU/CCU stay > 24 hours                              1    [  ] Age > 60                                                      1    IMPROVE VTE Score _____0____    IMPROVE Score 0-1: Low Risk, No VTE prophylaxis required for most patients, encourage ambulation.   IMPROVE Score 2-3: At risk, pharmacologic VTE prophylaxis is indicated for most patients (in the absence of a contraindication)  IMPROVE Score > or = 4: High Risk, pharmacologic VTE prophylaxis is indicated for most patients (in the absence of a contraindication)

## 2021-12-12 NOTE — ED STATDOCS - ATTENDING CONTRIBUTION TO CARE
I, Salvatore Hickey DO, personally saw the patient with ACP.  I have personally performed a face to face diagnostic evaluation on this patient and formulated the patient plan. The case was discussed with, and handed off to ACP who followed the case through to the re-evaluation and disposition.

## 2021-12-12 NOTE — ED STATDOCS - PROGRESS NOTE DETAILS
Chest X-Ray reviewed with Dr. Drew. Labs drawn CT Chest and CT abd./pelvis for further evaluation of findings on chest X-ray. Usama RAY 46 yr. old female presents to ED with pain in left side of chest radiating to back after trip and fall while running on Monday. No head injury or LOC. Felt dizzy after fall and sat down on a bench. pain in left side worsens upon deep breathing and movement. Seen and examined by attending in Intake. Plan: Chest X-ray and  X-Ray of ribs.  Will F/U with results and re evaluate. Usama NP KV: case discussed with trauma service, not felt to be from trauma / need trauma admission at this time. KV: ekg reviewed by Dr. Bradshaw- paul ST elevations and t wave inversion not felt to represent acute infarction. Recommending trending of troponin. Troponin trending up, will plan for admission.

## 2021-12-12 NOTE — H&P ADULT - HISTORY OF PRESENT ILLNESS
47 y/o F w/ PMH of bradycardia, p/w CP. Patient had a fall about 1 week ago. States that she forgot to take a mask, so she went back to the car and was going back to the store when she tripped over the curb and fell. She hit the L side of her body and had bruising on lateral side of abdomen (which she says has resolved) and had pain in her L shoulder / L upper chest area. States pain radiated to back as well. Since the fall she has continued to run, and during a run at [facility], patient states the L upper chest area had sharp pain in it. Yesterday when she tried to push off of a seat with LUE, she also had pain in that area. Patient states she did not have any dizziness / lightheadedness before the fall, but after the fall she felt dizziness momentarily as she was trying to her bearings. Denies cough, runny nose, sore throat, nausea, vomiting, abdominal pain, fever, chills     PSH:  x 2    Social Hx: Denies tobacco / drugs, Etoh - 2 drinks per week    Family Hx: Father - kidney problems

## 2021-12-12 NOTE — ED STATDOCS - NS ED ROS FT
Constitutional: No fever.  Eyes: No vision changes.   Ears, Nose, Mouth, Throat: No congestion.  Cardiovascular: L sided chest pain.  Respiratory: No difficulty breathing.  Gastrointestinal: No nausea . No vomiting.  Genitourinary: No dysuria.  Musculoskeletal: No joint pain.  Neurological: No headache.  Integumentary (skin and/or breast): No rash.

## 2021-12-12 NOTE — ED STATDOCS - PHYSICAL EXAMINATION
Vital signs as available reviewed.  General:  Comfortable, no acute distress.  Head:  Normocephalic, atraumatic.  Eyes:  Conjunctiva pink, no icterus. Pupils equal, round, reactive to light, extra-occular eye movements intact.  Cardiovascular:  Regular rate, no obvious murmur.  Respiratory:  Clear to auscultation, good air entry bilaterally.  Abdomen:  Soft, non-tender.  Musculoskeletal:  No tenderness to palpation over c/t/l spine. No tenderness to palpation over clavicles, shoulders, upper/lower arms, hip/pelvis, upper / lower legs. L upper / mid thoracic rib TTP , no ecchymosis  Neurologic: Alert and oriented, moving all extremities. Sensation intact.  Skin:  Warm and dry. No skin break. Vital signs as available reviewed.  General:  Comfortable, no acute distress.  Head:  Normocephalic, atraumatic.  Eyes:  Conjunctiva pink, no icterus. Pupils equal, round, reactive to light, extra-occular eye movements intact.  Cardiovascular:  Regular rate, no obvious murmur.  Respiratory:  Clear to auscultation, good air entry bilaterally.  Abdomen:  Soft, non-tender.  Musculoskeletal:  No tenderness to palpation over c/t/l spine. No tenderness to palpation over clavicles, shoulders, upper/lower arms, hip/pelvis, upper / lower legs. (+) L upper / mid thoracic rib TTP , no ecchymosis  Neurologic: Alert and oriented, moving all extremities. Sensation intact.  Skin:  Warm and dry. No skin break.

## 2021-12-13 ENCOUNTER — TRANSCRIPTION ENCOUNTER (OUTPATIENT)
Age: 46
End: 2021-12-13

## 2021-12-13 VITALS
SYSTOLIC BLOOD PRESSURE: 106 MMHG | HEART RATE: 43 BPM | DIASTOLIC BLOOD PRESSURE: 64 MMHG | OXYGEN SATURATION: 99 % | TEMPERATURE: 98 F | RESPIRATION RATE: 18 BRPM

## 2021-12-13 LAB
A1C WITH ESTIMATED AVERAGE GLUCOSE RESULT: 5.4 % — SIGNIFICANT CHANGE UP (ref 4–5.6)
ADD ON TEST-SPECIMEN IN LAB: SIGNIFICANT CHANGE UP
ADD ON TEST-SPECIMEN IN LAB: SIGNIFICANT CHANGE UP
ALBUMIN SERPL ELPH-MCNC: 3.2 G/DL — LOW (ref 3.3–5)
ALP SERPL-CCNC: 71 U/L — SIGNIFICANT CHANGE UP (ref 40–120)
ALT FLD-CCNC: 80 U/L — HIGH (ref 12–78)
ANION GAP SERPL CALC-SCNC: 4 MMOL/L — LOW (ref 5–17)
APTT BLD: 30.6 SEC — SIGNIFICANT CHANGE UP (ref 27.5–35.5)
AST SERPL-CCNC: 79 U/L — HIGH (ref 15–37)
BASOPHILS # BLD AUTO: 0.07 K/UL — SIGNIFICANT CHANGE UP (ref 0–0.2)
BASOPHILS NFR BLD AUTO: 1.4 % — SIGNIFICANT CHANGE UP (ref 0–2)
BILIRUB SERPL-MCNC: 0.3 MG/DL — SIGNIFICANT CHANGE UP (ref 0.2–1.2)
BUN SERPL-MCNC: 12 MG/DL — SIGNIFICANT CHANGE UP (ref 7–23)
CALCIUM SERPL-MCNC: 8.1 MG/DL — LOW (ref 8.5–10.1)
CHLORIDE SERPL-SCNC: 111 MMOL/L — HIGH (ref 96–108)
CHOLEST SERPL-MCNC: 162 MG/DL — SIGNIFICANT CHANGE UP
CO2 SERPL-SCNC: 25 MMOL/L — SIGNIFICANT CHANGE UP (ref 22–31)
CREAT SERPL-MCNC: 0.7 MG/DL — SIGNIFICANT CHANGE UP (ref 0.5–1.3)
CULTURE RESULTS: SIGNIFICANT CHANGE UP
EOSINOPHIL # BLD AUTO: 0.19 K/UL — SIGNIFICANT CHANGE UP (ref 0–0.5)
EOSINOPHIL NFR BLD AUTO: 3.7 % — SIGNIFICANT CHANGE UP (ref 0–6)
ESTIMATED AVERAGE GLUCOSE: 108 MG/DL — SIGNIFICANT CHANGE UP (ref 68–114)
GLUCOSE SERPL-MCNC: 92 MG/DL — SIGNIFICANT CHANGE UP (ref 70–99)
HCT VFR BLD CALC: 37 % — SIGNIFICANT CHANGE UP (ref 34.5–45)
HDLC SERPL-MCNC: 73 MG/DL — SIGNIFICANT CHANGE UP
HGB BLD-MCNC: 11.6 G/DL — SIGNIFICANT CHANGE UP (ref 11.5–15.5)
IMM GRANULOCYTES NFR BLD AUTO: 0.2 % — SIGNIFICANT CHANGE UP (ref 0–1.5)
INR BLD: 0.96 RATIO — SIGNIFICANT CHANGE UP (ref 0.88–1.16)
LIPID PNL WITH DIRECT LDL SERPL: 74 MG/DL — SIGNIFICANT CHANGE UP
LYMPHOCYTES # BLD AUTO: 2.51 K/UL — SIGNIFICANT CHANGE UP (ref 1–3.3)
LYMPHOCYTES # BLD AUTO: 49.3 % — HIGH (ref 13–44)
MCHC RBC-ENTMCNC: 28 PG — SIGNIFICANT CHANGE UP (ref 27–34)
MCHC RBC-ENTMCNC: 31.4 GM/DL — LOW (ref 32–36)
MCV RBC AUTO: 89.4 FL — SIGNIFICANT CHANGE UP (ref 80–100)
MONOCYTES # BLD AUTO: 0.4 K/UL — SIGNIFICANT CHANGE UP (ref 0–0.9)
MONOCYTES NFR BLD AUTO: 7.9 % — SIGNIFICANT CHANGE UP (ref 2–14)
NEUTROPHILS # BLD AUTO: 1.91 K/UL — SIGNIFICANT CHANGE UP (ref 1.8–7.4)
NEUTROPHILS NFR BLD AUTO: 37.5 % — LOW (ref 43–77)
NON HDL CHOLESTEROL: 89 MG/DL — SIGNIFICANT CHANGE UP
PLATELET # BLD AUTO: 189 K/UL — SIGNIFICANT CHANGE UP (ref 150–400)
POTASSIUM SERPL-MCNC: 4.5 MMOL/L — SIGNIFICANT CHANGE UP (ref 3.5–5.3)
POTASSIUM SERPL-SCNC: 4.5 MMOL/L — SIGNIFICANT CHANGE UP (ref 3.5–5.3)
PROT SERPL-MCNC: 6.1 GM/DL — SIGNIFICANT CHANGE UP (ref 6–8.3)
PROTHROM AB SERPL-ACNC: 11.2 SEC — SIGNIFICANT CHANGE UP (ref 10.6–13.6)
RBC # BLD: 4.14 M/UL — SIGNIFICANT CHANGE UP (ref 3.8–5.2)
RBC # FLD: 14.8 % — HIGH (ref 10.3–14.5)
SARS-COV-2 RNA SPEC QL NAA+PROBE: SIGNIFICANT CHANGE UP
SODIUM SERPL-SCNC: 140 MMOL/L — SIGNIFICANT CHANGE UP (ref 135–145)
SPECIMEN SOURCE: SIGNIFICANT CHANGE UP
TRIGL SERPL-MCNC: 72 MG/DL — SIGNIFICANT CHANGE UP
TROPONIN I, HIGH SENSITIVITY RESULT: 104.3 NG/L — HIGH
TROPONIN I, HIGH SENSITIVITY RESULT: 73.29 NG/L — HIGH
TSH SERPL-MCNC: 2.8 UU/ML — SIGNIFICANT CHANGE UP (ref 0.34–4.82)
WBC # BLD: 5.09 K/UL — SIGNIFICANT CHANGE UP (ref 3.8–10.5)
WBC # FLD AUTO: 5.09 K/UL — SIGNIFICANT CHANGE UP (ref 3.8–10.5)

## 2021-12-13 PROCEDURE — 93306 TTE W/DOPPLER COMPLETE: CPT | Mod: 26

## 2021-12-13 PROCEDURE — 99239 HOSP IP/OBS DSCHRG MGMT >30: CPT

## 2021-12-13 RX ORDER — SODIUM CHLORIDE 9 MG/ML
1000 INJECTION INTRAMUSCULAR; INTRAVENOUS; SUBCUTANEOUS
Refills: 0 | Status: DISCONTINUED | OUTPATIENT
Start: 2021-12-13 | End: 2021-12-13

## 2021-12-13 RX ADMIN — LIDOCAINE 1 PATCH: 4 CREAM TOPICAL at 06:34

## 2021-12-13 NOTE — DISCHARGE NOTE NURSING/CASE MANAGEMENT/SOCIAL WORK - PATIENT PORTAL LINK FT
You can access the FollowMyHealth Patient Portal offered by [facility] by registering at the following website: [email or web address]. By joining [employer]’s FollowMyHealth portal, you will also be able to view your health information using other applications (apps) compatible with our system.

## 2021-12-13 NOTE — PROGRESS NOTE ADULT - ASSESSMENT
HPI:  45 y/o F w/ PMH of bradycardia, p/w CP. Patient had a fall about 1 week ago. States that she forgot to take a mask, so she went back to the car and was going back to the store when she tripped over the curb and fell. She hit the L side of her body and had bruising on lateral side of abdomen (which she says has resolved) and had pain in her L shoulder / L upper chest area. States pain radiated to back as well. Since the fall she has continued to run, and during a run at [facility], patient states the L upper chest area had sharp pain in it. Yesterday when she tried to push off of a seat with LUE, she also had pain in that area. Patient states she did not have any dizziness / lightheadedness before the fall, but after the fall she felt dizziness momentarily as she was trying to her bearings. Denies cough, runny nose, sore throat, nausea, vomiting, abdominal pain, fever, chills     PSH:  x 2    Social Hx: Denies tobacco / drugs, Etoh - 2 drinks per week    Family Hx: Father - kidney problems    (12 Dec 2021 23:28)      Review of Systems:  CONSTITUTIONAL: No weakness, fevers or chills  EYES/ENT: No visual changes;  No vertigo or throat pain   NECK: No pain or stiffness  RESPIRATORY: No cough, wheezing, hemoptysis; No shortness of breath,   CARDIOVASCULAR: No chest pain or palpitations  GASTROINTESTINAL: No abdominal or epigastric pain. No nausea, vomiting, or hematemesis; No diarrhea or constipation.   GENITOURINARY: No dysuria, frequency or hematuria  NEUROLOGICAL: No numbness or weakness  SKIN: No itching, burning, rashes, or lesions   All other review of systems is negative unless indicated above    PHYSICAL EXAM:    Vital Signs Last 24 Hrs  T(C): 36.7 (13 Dec 2021 07:21), Max: 36.7 (13 Dec 2021 07:21)  T(F): 98 (13 Dec 2021 07:21), Max: 98 (13 Dec 2021 07:21)  HR: 45 (13 Dec 2021 07:21) (45 - 53)  BP: 114/77 (13 Dec 2021 07:21) (90/57 - 120/78)  BP(mean): 88 (13 Dec 2021 07:21) (85 - 89)  RR: 16 (13 Dec 2021 07:21) (14 - 18)  SpO2: 100% (13 Dec 2021 07:21) (98% - 100%)    GENERAL: comfortable   HEAD:  Atraumatic, Normocephalic  EYES: EOMI, PERRLA, conjunctiva and sclera clear  HEENT: Moist mucous membranes  NECK: Supple, No JVD  NERVOUS SYSTEM:  Alert & Oriented X3, Motor Strength 5/5 B/L upper and lower extremities; DTRs 2+ intact and symmetric  CHEST/LUNG: Clear to auscultation bilaterally; No rales, rhonchi, wheezing, or rubs  HEART:S1S2 normal, no murmer  ABDOMEN: Soft, Nontender, Nondistended; Bowel sounds present  GENITOURINARY- Voiding, no palpable bladder  EXTREMITIES:  2+ Peripheral Pulses, No clubbing, cyanosis, or edema  MUSCULOSKELTAL- No muscle tenderness, Muscle tone normal, No joint tenderness, no Joint swelling, Joint range of motion-normal  SKIN-no rash, no lesion  PSYCH- Mood stable  LYMPH Node- No palpable lymph node    LABS:                        11.6   5.09  )-----------( 189      ( 13 Dec 2021 07:17 )             37.0     12-13    140  |  111<H>  |  12  ----------------------------<  92  4.5   |  25  |  0.70    Ca    8.1<L>      13 Dec 2021 07:17    TPro  6.1  /  Alb  3.2<L>  /  TBili  0.3  /  DBili  x   /  AST  79<H>  /  ALT  80<H>  /  AlkPhos  71  12-13    PT/INR - ( 13 Dec 2021 07:17 )   PT: 11.2 sec;   INR: 0.96 ratio         PTT - ( 13 Dec 2021 07:17 )  PTT:30.6 sec  Urinalysis Basic - ( 12 Dec 2021 18:25 )    Color: Yellow / Appearance: Clear / S.015 / pH: x  Gluc: x / Ketone: Negative  / Bili: Negative / Urobili: Negative mg/dL   Blood: x / Protein: Negative mg/dL / Nitrite: Negative   Leuk Esterase: Negative / RBC: 0-2 /HPF / WBC 0-2   Sq Epi: x / Non Sq Epi: Occasional / Bacteria: Occasional        CAPILLARY BLOOD GLUCOSE                Standing medicine  acetaminophen     Tablet .. 650 milliGRAM(s) Oral every 6 hours PRN  sodium chloride 0.9%. 1000 milliLiter(s) IV Continuous <Continuous>      A/P    #chest pain   -ct tele monitoring, cardio evaluation to follow   -echo   -probably most likely musculoskeletal  -CT study -negative for any acute pathology      #Bradycardia- monitor in tele     #gentle iv fluids in view of contrast study

## 2021-12-13 NOTE — DISCHARGE NOTE PROVIDER - HOSPITAL COURSE
47 y/o F w/ PMH of bradycardia, p/w CP. Patient had a fall about 1 week ago. States that she forgot to take a mask, so she went back to the car and was going back to the store when she tripped over the curb and fell. She hit the L side of her body and had bruising on lateral side of abdomen (which she says has resolved) and had pain in her L shoulder / L upper chest area. States pain radiated to back as well. Since the fall she has continued to run, and during a run at [facility], patient states the L upper chest area had sharp pain in it. Yesterday when she tried to push off of a seat with LUE, she also had pain in that area. Patient states she did not have any dizziness / lightheadedness before the fall, but after the fall she felt dizziness momentarily as she was trying to her bearings. Denies cough, runny nose, sore throat, nausea, vomiting, abdominal pain, fever, chills     #After monitoring here it is felt that her chest pain is due to cardiac contusion and musculoskeletal in nature . Her heart rate is low due to her being athletic. At this point she is being discharged and she will follow up at cardiology office for ongoing monitoring and management. Discharge plan discussed with Dr. Crowley and pt, all questions have been answered     #time spent 45 minutes

## 2021-12-13 NOTE — ED ADULT NURSE REASSESSMENT NOTE - NS ED NURSE REASSESS COMMENT FT1
Pt stable. Pt seen by MD. Pt cleared for discharge. Pt was given discharge paperwork and understands instructions and will follow up outpt.

## 2021-12-13 NOTE — CONSULT NOTE ADULT - SUBJECTIVE AND OBJECTIVE BOX
Cardiac enzyme elevation possibly due to cardiac contusion, which was exacerbated by running.  Check inflammatory markers.  Obtain echo.  If above work-up negative, discharge and outpatient repeat troponins and if persistently positive will consider ischemic work-up and CMR.   Patient is a 46y old  Female who presents with a chief complaint of CP (13 Dec 2021 13:49)    ________________________________  ALLYSSA GOYAL is a 46y year old Female with a past medical history of mild mitral valve regurgitation on previous echocardiogram, who is a long-distance runner.    She was seen last year for dizziness and vague right arm pain.  She underwent an echocardiogram showing no abnormalities outside of mild mitral valve regurgitation.      The patient presents for evaluation of chest discomfort.  The patient states that she was running last week, left the left side of her chest very hard.  She states that symptoms persisted, but despite that she wanted to continue her run.  She has been admitted for chest discomfort mild ovation cardiac enzymes which remained flat.  Cardiac enzymes this has since trended down.  Echocardiac showed preserved LVEF.    CT chest showed abnormalities.        PREVIOUS CARDIAC WORKUP:    Echocardiogram 2020  --There is no left atrial dilatation.  --LV global wall motion is normal.  --LV ejection fraction (65 %) is normal.  --Normal left ventricular diastolic function with normal left atrial pressure.  --The aortic root is normal in size (2.80 cm). Ascending aorta is normal in size.  --There is mild mitral regurgitation. Two small jets present.  --There is mild tricuspid regurgitation.  --There is trace pulmonic regurgitation.  --The right atrial pressure is normal (0 - 5 mm Hg). There is no pulmonary hypertension. RVSP   27 mmHg.  --There is no pericardial effusion.     ________________________________  Review of systems: A 10 point review of system has been performed, and is negative except for what has been mentioned in the above history of present illness.     PAST MEDICAL & SURGICAL HISTORY:  No pertinent past medical history    No significant past surgical history      FAMILY HISTORY:     The patient denies any history of premature CAD or sudden cardiac death.    SOCIAL HISTORY: The patient denies any history of tobacco abuse, alcohol abuse or illicit drug use.    ALLERGIES:  No Known Allergies    Home Medications:    MEDICATIONS  (STANDING):  sodium chloride 0.9%. 1000 milliLiter(s) (75 mL/Hr) IV Continuous <Continuous>    MEDICATIONS  (PRN):  acetaminophen     Tablet .. 650 milliGRAM(s) Oral every 6 hours PRN Mild Pain (1 - 3)    Vital Signs Last 24 Hrs  T(C): 36.7 (13 Dec 2021 11:15), Max: 36.7 (13 Dec 2021 07:21)  T(F): 98.1 (13 Dec 2021 11:15), Max: 98.1 (13 Dec 2021 11:15)  HR: 43 (13 Dec 2021 11:15) (43 - 53)  BP: 106/64 (13 Dec 2021 11:15) (90/57 - 120/78)  BP(mean): 77 (13 Dec 2021 11:15) (77 - 89)  RR: 18 (13 Dec 2021 11:15) (14 - 18)  SpO2: 99% (13 Dec 2021 11:15) (98% - 100%)  I&O's Summary    ________________________________  GENERAL APPEARANCE:  No acute distress  HEAD: normocephalic, atraumatic  NECK: supple, no jugular venous distention, no carotid bruit    HEART: Regular rate and rhythm, S1, S2 normal, 1/6 murmur    CHEST:  No anterior chest wall tenderness    LUNGS:  Clear to auscultation, without any wheezing, rhonchi or rales    ABDOMEN: soft, nontender, nondistended, with positive bowel sounds appreciated  EXTREMITIES: no edema.   NEURO: Alert and oriented x3  PSYC:  Normal affect  SKIN:  Dry  ________________________________   TELEMETRY: Sinus rhythm    ECG: Sinus rhythm without any acute abnormalities    LABS:                        11.6   5.09  )-----------( 189      ( 13 Dec 2021 07:17 )             37.0             12-13    140  |  111<H>  |  12  ----------------------------<  92  4.5   |  25  |  0.70    Ca    8.1<L>      13 Dec 2021 07:17    TPro  6.1  /  Alb  3.2<L>  /  TBili  0.3  /  DBili  x   /  AST  79<H>  /  ALT  80<H>  /  AlkPhos  71  13      Lipid Panel  Chl 162  HDL 73  LDL --  Trg 72  LIVER FUNCTIONS - ( 13 Dec 2021 07:17 )  Alb: 3.2 g/dL / Pro: 6.1 gm/dL / ALK PHOS: 71 U/L / ALT: 80 U/L / AST: 79 U/L / GGT: x         PT/INR - ( 13 Dec 2021 07:17 )   PT: 11.2 sec;   INR: 0.96 ratio         PTT - ( 13 Dec 2021 07:17 )  PTT:30.6 sec  Urinalysis Basic - ( 12 Dec 2021 18:25 )    Color: Yellow / Appearance: Clear / S.015 / pH: x  Gluc: x / Ketone: Negative  / Bili: Negative / Urobili: Negative mg/dL   Blood: x / Protein: Negative mg/dL / Nitrite: Negative   Leuk Esterase: Negative / RBC: 0-2 /HPF / WBC 0-2   Sq Epi: x / Non Sq Epi: Occasional / Bacteria: Occasional        PT/INR - ( 13 Dec 2021 07:17 )   PT: 11.2 sec;   INR: 0.96 ratio         PTT - ( 13 Dec 2021 07:17 )  PTT:30.6 sec  Urinalysis Basic - ( 12 Dec 2021 18:25 )    Color: Yellow / Appearance: Clear / S.015 / pH: x  Gluc: x / Ketone: Negative  / Bili: Negative / Urobili: Negative mg/dL   Blood: x / Protein: Negative mg/dL / Nitrite: Negative   Leuk Esterase: Negative / RBC: 0-2 /HPF / WBC 0-2   Sq Epi: x / Non Sq Epi: Occasional / Bacteria: Occasional             ________________________________    RADIOLOGY & ADDITIONAL STUDIES:       ________________________________    ASSESSMENT:  Elevated cardiac enzymes, possibly resolving myocardial contusion, possibly exacerbated by running  Chest discomfort-cardiac contusion versus muscle skeletal  Mitral valve regurgitation    PLAN:  In summary, this is a 46y Female with a past medical history of mitral valve regurgitation admitted for chest discomfort, likely due to cardiac contusion.  Doubt ACS.  Symptoms of chest pain is more some muscle skeletal in etiology.  Recommend obtaining echo.  Check inflammatory markers.  Repeat troponin.    Addendum:  Echocardiogram 21     The left ventricle is normal in size, wall thickness, wall motion and   contractility.   Estimated left ventricular ejection fraction is 60-65 %.   Normal appearing left atrium.   Normal appearing right atrium.   Normal appearing right ventricle structure and function.   The aortic valve is well visualized, appears normal. Valve opening seems   to be normal.   Trace aortic regurgitation is present.   Normal appearing mitral valve structure.   Trace to mild (1+) mitral regurgitation is present.   The tricuspid valve leaflets are thin and pliable; valve motion is   normal.   Trace tricuspid valve regurgitation is present.   Normal appearing pulmonic valve structure and function.   No evidence of pericardial effusion.   No evidence of pleural effusion.    Inflammatory markers negative.  Troponin trending down.  Given preserved LVEF with no wall motion abnormalities of abnormalities noted on echo, outpatient follow-up recommended.  May benefit from cardiac CTA if repeat troponins as outpatient is positive.  Cardiac    __________________________________________________________________________  Thank you for allowing me to participate in the care of your patient. Please contact me should any questions arise.    PATRICIA Crowley DO, [facility]  [phone]

## 2021-12-13 NOTE — DISCHARGE NOTE NURSING/CASE MANAGEMENT/SOCIAL WORK - NSDCPEFALRISK_GEN_ALL_CORE
For information on Fall & Injury Prevention, visit: [email or web address] OR  [email or web address] OR  https://www.cdc.gov/steadi/patient.html

## 2021-12-13 NOTE — DISCHARGE NOTE PROVIDER - CARE PROVIDER_API CALL
Jraet Crowley (DO; JEMMA)  Cardiology  [address] [place] [address]  Phone: [phone]  Fax: [phone]  Follow Up Time: 1 week

## 2021-12-17 DIAGNOSIS — R00.1 BRADYCARDIA, UNSPECIFIED: ICD-10-CM

## 2021-12-17 DIAGNOSIS — R07.89 OTHER CHEST PAIN: ICD-10-CM
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
## 2021-12-17 DIAGNOSIS — I34.0 NONRHEUMATIC MITRAL (VALVE) INSUFFICIENCY: ICD-10-CM
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
## 2021-12-17 DIAGNOSIS — S26.91XA CONTUSION OF HEART, UNSPECIFIED WITH OR WITHOUT HEMOPERICARDIUM, INITIAL ENCOUNTER: ICD-10-CM
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
## 2022-10-19 ENCOUNTER — APPOINTMENT (OUTPATIENT)
Dept: ORTHOPEDIC SURGERY | Facility: CLINIC | Age: 47
End: 2022-10-19

## 2022-10-27 ENCOUNTER — APPOINTMENT (OUTPATIENT)
Dept: ORTHOPEDIC SURGERY | Facility: CLINIC | Age: 47
End: 2022-10-27

## 2022-10-27 ENCOUNTER — NON-APPOINTMENT (OUTPATIENT)
Age: 47
End: 2022-10-27

## 2022-10-27 PROCEDURE — 99204 OFFICE O/P NEW MOD 45 MIN: CPT

## 2022-10-27 PROCEDURE — 73630 X-RAY EXAM OF FOOT: CPT | Mod: LT

## 2022-10-27 PROCEDURE — 73610 X-RAY EXAM OF ANKLE: CPT | Mod: LT

## 2022-10-27 NOTE — PHYSICAL EXAM
[de-identified] : General: Alert and oriented x3. In no acute distress. Pleasant in nature with a normal affect. No apparent respiratory distress.\par \par Left Ankle Exam\par Skin: Clean, dry, intact\par Inspection: No obvious malalignment, no swelling, no effusion; no lymphadenopathy\par Pulses: 2+ DP/PT pulses\par ROM:10 degrees of dorsiflexion, 40 degrees of plantarflexion, 10 degrees of subtalar motion\par Tenderness: Pain over the posterior tibial tendon. No tenderness over the lateral malleolus, no CFL/ATFL/PTFL pain. No medial malleolus pain, no deltoid ligament pain. No proximal fibular pain. No heel pain.\par Stability: Negative anterior/posterior drawer.\par Strength: 5/5 TA/GS/EHL\par Neuro: In tact to light touch throughout\par Additional tests: Negative Mortons test, Negative syndesmosis squeeze test.\par \par Bilateral Standing Exam: Symmetric arch height.  [de-identified] : 3V of the left ankle were ordered, obtained, and reviewed by me today, 10/27/2022, revealed: No acute fractures. \par \par 3V of the left foot were ordered, obtained, and reviewed by me today, 10/27/2022, revealed: Accessory navicular. No acute fractures. \par

## 2022-10-27 NOTE — ADDENDUM
[FreeTextEntry1] : I, Miriam Flower, acted solely as a scribe for Dr. Pietro Babin on this date 10/27/2022.\par \par All medical record entries made by the Scribe were at my, Dr. Pietro Babin, direction and personally dictated by me on 10/27/2022. I have reviewed the chart and agree that the record accurately reflects my personal performance of the history, physical exam, assessment and plan. I have also personally directed, reviewed, and agreed with the chart.	\par

## 2022-10-27 NOTE — DISCUSSION/SUMMARY
[de-identified] : Today I had a lengthy discussion with the patient regarding their left ankle pain. I have addressed all the patient's concerns surrounding the pathology of their condition. XR imaging was completed in office today and results were reviewed with the patient. At this time I would like to obtain advanced imaging of the patient's left ankle. An MRI was ordered so I can find out more about the etiology of the patient's condition. The patient should follow up with the office after obtaining the MRI. The patient understood and verbally agreed to the treatment plan. All of their questions were answered and they were satisfied with the visit. The patient should call the office if they have any questions or experience worsening symptoms.

## 2022-10-27 NOTE — HISTORY OF PRESENT ILLNESS
[FreeTextEntry1] : The patient is a 47 year old female presenting for an initial evaluation of acute left ankle pain since September. The patient cannot attribute their pain to any injury, fall, or trauma. The patient is a high level marathon runner, reporting that she recently completed the [place] back in September. The patient reports medial sided ankle pain, worst with activity and running. She states that she is unable to run past 8 miles due to constant pain in her ankle. She notes her pain as a 6 out of 10. Rest improves her symptoms. She is wearing sneakers and is walking without assistance. No other complaints.

## 2022-10-29 ENCOUNTER — OUTPATIENT (OUTPATIENT)
Dept: OUTPATIENT SERVICES | Facility: HOSPITAL | Age: 47
LOS: 1 days | End: 2022-10-29
Payer: COMMERCIAL

## 2022-10-29 ENCOUNTER — APPOINTMENT (OUTPATIENT)
Dept: MRI IMAGING | Facility: CLINIC | Age: 47
End: 2022-10-29

## 2022-10-29 DIAGNOSIS — Q74.2 OTHER CONGENITAL MALFORMATIONS OF LOWER LIMB(S), INCLUDING PELVIC GIRDLE: ICD-10-CM

## 2022-10-29 DIAGNOSIS — M25.572 PAIN IN LEFT ANKLE AND JOINTS OF LEFT FOOT: ICD-10-CM

## 2022-10-29 DIAGNOSIS — Z00.8 ENCOUNTER FOR OTHER GENERAL EXAMINATION: ICD-10-CM

## 2022-10-29 DIAGNOSIS — M76.822 POSTERIOR TIBIAL TENDINITIS, LEFT LEG: ICD-10-CM

## 2022-10-29 PROCEDURE — 73721 MRI JNT OF LWR EXTRE W/O DYE: CPT | Mod: 26,LT

## 2022-10-29 PROCEDURE — 73721 MRI JNT OF LWR EXTRE W/O DYE: CPT

## 2022-11-04 ENCOUNTER — APPOINTMENT (OUTPATIENT)
Dept: ORTHOPEDIC SURGERY | Facility: CLINIC | Age: 47
End: 2022-11-04

## 2022-11-04 DIAGNOSIS — M67.874 OTHER SPECIFIED DISORDERS OF TENDON, LEFT ANKLE AND FOOT: ICD-10-CM

## 2022-11-04 DIAGNOSIS — M25.572 PAIN IN LEFT ANKLE AND JOINTS OF LEFT FOOT: ICD-10-CM

## 2022-11-04 DIAGNOSIS — M65.9 SYNOVITIS AND TENOSYNOVITIS, UNSPECIFIED: ICD-10-CM

## 2022-11-04 DIAGNOSIS — M76.822 POSTERIOR TIBIAL TENDINITIS, LEFT LEG: ICD-10-CM

## 2022-11-04 DIAGNOSIS — Q74.2 OTHER CONGENITAL MALFORMATIONS OF LOWER LIMB(S), INCLUDING PELVIC GIRDLE: ICD-10-CM

## 2022-11-04 PROCEDURE — 99213 OFFICE O/P EST LOW 20 MIN: CPT

## 2022-11-04 RX ORDER — MELOXICAM 15 MG/1
15 TABLET ORAL DAILY
Qty: 30 | Refills: 0 | Status: ACTIVE | COMMUNITY
Start: 2022-11-04 | End: 1900-01-01

## 2022-11-04 RX ORDER — METHYLPREDNISOLONE 4 MG/1
4 TABLET ORAL
Qty: 1 | Refills: 0 | Status: ACTIVE | COMMUNITY
Start: 2022-11-04 | End: 1900-01-01

## 2022-11-04 RX ORDER — DICLOFENAC SODIUM 16.05 MG/ML
1.5 SOLUTION TOPICAL
Qty: 1 | Refills: 3 | Status: ACTIVE | COMMUNITY
Start: 2022-11-04 | End: 1900-01-01

## 2022-11-04 NOTE — DISCUSSION/SUMMARY
[de-identified] : At this time I reviewed the MRI with her in great details and answered all her questions.  I do not see a reason why she cannot run the marathon but I do want her to listen to her ankle and foot.  If she starts to experience any sharp stabbing pains that she has to stop running.  I prescribed her physical therapy to get in tomorrow for stretching program and some K tape for her ankle and foot.  She will use over-the-counter anti-inflammatory gels which are prescribed to her pharmacy.  I also prescribed a One-A-Day anti-inflammatory, meloxicam, but told to be careful and drink plenty of fluids if she is going to take any type of anti-inflammatory prior to her run.  I also prescribed her a steroid pack.  She could use a steroid pack after the run if she has pain next week.  All of her questions were answered.  If she runs into any problems I told her to give us a call and/or come in.  If she has no problems, she could follow-up on an as-needed basis.

## 2022-11-04 NOTE — PHYSICAL EXAM
[de-identified] : General: Alert and oriented x3. In no acute distress. Pleasant in nature with a normal affect. No apparent respiratory distress.\par \par Left Ankle Exam\par Skin: Clean, dry, intact\par Inspection: No obvious malalignment, no swelling, no effusion; no lymphadenopathy\par Pulses: 2+ DP/PT pulses\par ROM:10 degrees of dorsiflexion, 40 degrees of plantarflexion, 10 degrees of subtalar motion\par Tenderness: Pain over the posterior tibial tendon. No tenderness over the lateral malleolus, no CFL/ATFL/PTFL pain. No medial malleolus pain, no deltoid ligament pain. No proximal fibular pain. No heel pain.\par Stability: Negative anterior/posterior drawer.\par Strength: 5/5 TA/GS/EHL\par Neuro: In tact to light touch throughout\par Additional tests: Negative Mortons test, Negative syndesmosis squeeze test.\par \par Bilateral Standing Exam: Symmetric arch height.  [de-identified] : EXAM: [id number] - MR ANKLE LT - ORDERED BY: BRIT SHERWOOD\par \par \par PROCEDURE DATE: 10/29/2022\par \par \par \par INTERPRETATION: EXAMINATION: MR ANKLE LEFT\par \par CLINICAL INDICATION: Left ankle pain. Evaluate for tear of posterior tibial tendon/navicular stress fracture.\par \par COMPARISON: None.\par \par TECHNIQUE: Multiplanar, multi-sequence MRI of the left ankle was performed without intravenous contrast.\par \par INTERPRETATION:\par \par Localizer: No additional findings.\par \par Joint space: There are small tibiotalar and subtalar joint effusions.\par \par Bones and articular cartilage: There is no fracture or significant bone marrow edema. There is no focal cartilage defect. There is a type II accessory navicular bone noted without internal edema.\par \par Tendons and bursae: The extensor are intact. Mild tenosynovitis is present at the peroneus longus, tibialis posterior, and flexor digitorum longus tendon sheaths. Minimal Achilles tendinosis is present. There is no abnormal bursal distension.\par \par Ligaments: The tibiofibular, talofibular, and calcaneofibular ligaments are intact. The anterior fibers of the deltoid ligament complex demonstrate mild irregularity and T2 hyperintensity. The spring ligament is intact.\par \par Plantar fascia: The plantar fascia is normal in signal and thickness.\par \par Other: There is mild muscle edema within the distal soleus muscle and within the flexor hallucis muscle with feathery T2 hyperintensity. At the flexor hallucis muscle, there is peripheral tracking fluid superficial to the muscle belly.\par \par IMPRESSION:\par 1. Mild muscle strain involving the flexor hallucis and soleus muscles.\par \par 2. Mild tenosynovitis of peroneus longus, tibialis posterior and flexor digitorum longus.Minimal insertional Achilles tendinosis.\par \par 3. Remote deltoid ligament sprain.\par \par 4. Small tibiotalar and subtalar joint effusions\par \par --- End of Report ---\par \par \par \par \par \par PHILL PEREZ MD; Resident Radiologist\par This document has been electronically signed.\par SHLOMIT A GOLDBERG-STEIN MD; Attending Radiologist\par This document has been electronically signed. Nov 1 2022 6:49PM\par \par

## 2022-11-04 NOTE — HISTORY OF PRESENT ILLNESS
[FreeTextEntry1] : 11/4/2022: The patient is a 47 year old female presenting for a MRI review of her left ankle.  The patient is currently training for the [place] marathon and plans to run it this Sunday.  She denies locking catching of her ankle.  She has no swelling present.  She is walking without assistance with regular sneakers on.  Pain scale is minimal today in the office.  No other complaints.\par \par 10/27/2022: The patient is a 47 year old female presenting for an initial evaluation of acute left ankle pain since September. The patient cannot attribute their pain to any injury, fall, or trauma. The patient is a high level marathon runner, reporting that she recently completed the [place] back in September. The patient reports medial sided ankle pain, worst with activity and running. She states that she is unable to run past 8 miles due to constant pain in her ankle. She notes her pain as a 6 out of 10. Rest improves her symptoms. She is wearing sneakers and is walking without assistance. No other complaints.

## 2022-11-15 NOTE — ED STATDOCS - PROGRESS NOTE
Stable. Improved. Protopic Pregnancy And Lactation Text: This medication is Pregnancy Category C. It is unknown if this medication is excreted in breast milk when applied topically.

## 2023-01-26 NOTE — ED ADULT TRIAGE NOTE - SOURCE OF INFORMATION
Patient Complex Repair And Single Advancement Flap Text: The defect edges were debeveled with a #15 scalpel blade.  The primary defect was closed partially with a complex linear closure.  Given the location of the remaining defect, shape of the defect and the proximity to free margins a single advancement flap was deemed most appropriate for complete closure of the defect.  Using a sterile surgical marker, an appropriate advancement flap was drawn incorporating the defect and placing the expected incisions within the relaxed skin tension lines where possible.    The area thus outlined was incised deep to adipose tissue with a #15 scalpel blade.  The skin margins were undermined to an appropriate distance in all directions utilizing iris scissors.

## 2023-02-03 NOTE — ED STATDOCS - DISPOSITION TYPE
6D: impaired aerobic capacity/endurance associated with cardiovascular pump dysfunction or failure DISCHARGE

## 2023-02-27 NOTE — ED ADULT NURSE NOTE - NS ED NURSE RECORD ANOTHER VITAL SIGN
sterile technique, indwelling urinary device inserted/sterile technique, old cysto removed, new tube inserted/a urinary catheter insertion kit was used for all insertion materials
sterile technique, indwelling urinary device inserted
Yes

## 2023-05-09 ENCOUNTER — APPOINTMENT (OUTPATIENT)
Dept: ORTHOPEDIC SURGERY | Facility: CLINIC | Age: 48
End: 2023-05-09
Payer: COMMERCIAL

## 2023-05-09 VITALS
WEIGHT: 124 LBS | SYSTOLIC BLOOD PRESSURE: 110 MMHG | DIASTOLIC BLOOD PRESSURE: 67 MMHG | BODY MASS INDEX: 21.17 KG/M2 | HEART RATE: 55 BPM | HEIGHT: 64 IN

## 2023-05-09 DIAGNOSIS — M54.16 RADICULOPATHY, LUMBAR REGION: ICD-10-CM

## 2023-05-09 PROCEDURE — 72100 X-RAY EXAM L-S SPINE 2/3 VWS: CPT

## 2023-05-09 PROCEDURE — 73502 X-RAY EXAM HIP UNI 2-3 VIEWS: CPT | Mod: LT

## 2023-05-09 PROCEDURE — 99214 OFFICE O/P EST MOD 30 MIN: CPT

## 2023-05-09 RX ORDER — MELOXICAM 15 MG/1
15 TABLET ORAL DAILY
Qty: 30 | Refills: 1 | Status: ACTIVE | COMMUNITY
Start: 2023-05-09 | End: 1900-01-01

## 2023-05-09 NOTE — HISTORY OF PRESENT ILLNESS
[de-identified] : 5/9/2023–patient presents with left hip pain.  Is been going on for approximately half a year.  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  Scribes the pain is mostly in her buttock as well as the posterior aspect of her thigh.  Denies numbness and tingling.  Is not taking anything for this.  Denies allergies anticoagulation, former tobacco user 18 years ago, but no past medical

## 2023-05-09 NOTE — PHYSICAL EXAM
[de-identified] : General Appearance: normal without acute distress\par Mental: Alert and oriented x 3\par Psych/affect: appropriate, cooperative\par Gait: [normal] gait\par \par Left hip\par Tender palpation over left greater trochanter, as well as anterior and proximal to the greater troch\par Mild discomfort with IRVIN and FADIR to extension maneuvers\par  [de-identified] : 5/9/2023–AP Pelvis and 2 views of the left hip were reviewed and demonstrate well-preserved joint space, no abnormalities\par AP lateral lumbar spine–no abnormalities

## 2023-05-09 NOTE — DISCUSSION/SUMMARY
[de-identified] : Left greater trochanteric bursitis, tendinitis\par \par Extensive discussion of the natural history of this issue was had with the patient.  We discussed the treatment options focusing on conservative therapy which includes anti-inflammatories, physical therapy/home exercise, & activity modification.  \par -A prescription for meloxicam with the appropriate warnings for GI, cardiac, and renal side effects was given to the patient.  Patient was instructed not to use any other NSAIDs with this medication.\par -Physical therapy prescription was given to the patient.  Referral for massage therapy was also given.\par Patient will return if the pain returns or does not resolve.

## 2023-07-06 DIAGNOSIS — M70.62 TROCHANTERIC BURSITIS, LEFT HIP: ICD-10-CM

## 2024-06-23 NOTE — ED ADULT NURSE NOTE - CINV DISCH MEDS REVIEWED YN
Pt states worsening rash with increased itching to whole body with small open blisters on hands x days. left arm swelling since yesterday. Yes

## 2024-08-21 NOTE — ED ADULT NURSE NOTE - CAS DISCH CONDITION
Stable Dangelo Quintanilla  [facility]  SURGONC [address] [facility]  Scheduled Appointment: 09/04/2024    Fabian Nolasco  [facility]  ENDOCRIN [address] [facility] [address]  Scheduled Appointment: 10/11/2024    Donato Mota  [facility]  RADMED [address]  Scheduled Appointment: 10/16/2024

## 2024-08-22 ENCOUNTER — APPOINTMENT (OUTPATIENT)
Dept: ORTHOPEDIC SURGERY | Facility: CLINIC | Age: 49
End: 2024-08-22
Payer: COMMERCIAL

## 2024-08-22 DIAGNOSIS — M70.62 TROCHANTERIC BURSITIS, LEFT HIP: ICD-10-CM

## 2024-08-22 DIAGNOSIS — M25.852 OTHER SPECIFIED JOINT DISORDERS, LEFT HIP: ICD-10-CM

## 2024-08-22 PROCEDURE — 73503 X-RAY EXAM HIP UNI 4/> VIEWS: CPT

## 2024-08-22 PROCEDURE — 99214 OFFICE O/P EST MOD 30 MIN: CPT

## 2024-08-22 NOTE — PHYSICAL EXAM
[de-identified] : General: Well appearing, no acute distress Neurologic: A&Ox3, No focal deficits Head: NCAT without abrasions, lacerations, or ecchymosis to head, face, or scalp Eyes: No scleral icterus, no gross abnormalities Respiratory: Equal chest wall expansion bilaterally, no accessory muscle use Lymphatic: No lymphadenopathy palpated Skin: Warm and dry Psychiatric: Normal mood and affect  Examination of the Left hip reveals no obvious deformity or leg length discrepancy. There is no swelling noted. The patient is nontender to palpation over the greater trochanter, groin, and IT band. Tender over ischial tuberosity, hamstring insertion, semimembranosus insertion, and adductor insertion. Pubic symphysis tenderness. The patients range of motion is to 110 degrees of hip flexion, 40 degrees of abduction, 20 degrees of adduction, 40 degrees of internal rotation and 45 degrees of external rotation. The patient has 4/5 strength to resisted hip flexion, abduction and adduction. The patient has a POS IRVIN to glutes. Negative FADIR Test. Hyperflexion causes pain in groin and deep gluteal region. Negative García Test. Negative resisted SLR test at 30 degrees of hip flexion ([facility]). Negative Piriformis Test. No SI joint instability. There is no pain with logrolling. The calf and thigh are soft and nontender bilaterally. The patient is grossly neurovascularly intact distally [de-identified] : 2 views of left hip were performed today and available for me to review. Results were discussed with the patient. They demonstrate no f/x, dislocation, or other deformity.  3 views of pelvis were performed today and available for me to review. Results were discussed with the patient. They demonstrate no f/x, dislocation, or other deformity.

## 2024-08-22 NOTE — CONSULT LETTER
[Dear  ___] : Dear  [unfilled], [Consult Letter:] : I had the pleasure of evaluating your patient, [unfilled]. [Please see my note below.] : Please see my note below. [Sincerely,] : Sincerely, [FreeTextEntry3] : Dr. Seamus Rivas

## 2024-08-22 NOTE — DISCUSSION/SUMMARY
[de-identified] : We had a thorough discussion regarding the nature of her pain, the pathophysiology, as well as all treatment options. I discussed operative and non-operative treatment modalities. Patient should utilize hip mobility exercise and glute strengthening regimen. Considering the patient's current presentation of pain, physical exam, and radiographs an MRI is indicated at this time. A prescription for this was given to the patient. We will go over the MRI results with her upon obtaining the results in the office and advise her of further treatment options. She agrees with the above plan and all questions were answered.

## 2024-08-22 NOTE — HISTORY OF PRESENT ILLNESS
[Worsening] : worsening [___ mths] : [unfilled] month(s) ago [de-identified] : YANET GOYAL is a 49 year old female being seen for left hip pain. She states she has been experiencing pain for 18 months. 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 She notes she experiences pain when running up and down hills as well as when she sits for long periods of time. She notes at the end of the week she feels increased pain after running long amounts of cumulative miles. She points to her outer hip and left buttock as the points of her pain.

## 2024-08-22 NOTE — HISTORY OF PRESENT ILLNESS
[Worsening] : worsening [___ mths] : [unfilled] month(s) ago [de-identified] : YANET GOYAL is a 49 year old female being seen for left hip pain. She states she has been experiencing pain for 18 months. 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 She notes she experiences pain when running up and down hills as well as when she sits for long periods of time. She notes at the end of the week she feels increased pain after running long amounts of cumulative miles. She points to her outer hip and left buttock as the points of her pain.

## 2024-08-22 NOTE — DISCUSSION/SUMMARY
[de-identified] : We had a thorough discussion regarding the nature of her pain, the pathophysiology, as well as all treatment options. I discussed operative and non-operative treatment modalities. Patient should utilize hip mobility exercise and glute strengthening regimen. Considering the patient's current presentation of pain, physical exam, and radiographs an MRI is indicated at this time. A prescription for this was given to the patient. We will go over the MRI results with her upon obtaining the results in the office and advise her of further treatment options. She agrees with the above plan and all questions were answered.

## 2024-08-22 NOTE — PHYSICAL EXAM
[de-identified] : General: Well appearing, no acute distress Neurologic: A&Ox3, No focal deficits Head: NCAT without abrasions, lacerations, or ecchymosis to head, face, or scalp Eyes: No scleral icterus, no gross abnormalities Respiratory: Equal chest wall expansion bilaterally, no accessory muscle use Lymphatic: No lymphadenopathy palpated Skin: Warm and dry Psychiatric: Normal mood and affect  Examination of the Left hip reveals no obvious deformity or leg length discrepancy. There is no swelling noted. The patient is nontender to palpation over the greater trochanter, groin, and IT band. Tender over ischial tuberosity, hamstring insertion, semimembranosus insertion, and adductor insertion. Pubic symphysis tenderness. The patients range of motion is to 110 degrees of hip flexion, 40 degrees of abduction, 20 degrees of adduction, 40 degrees of internal rotation and 45 degrees of external rotation. The patient has 4/5 strength to resisted hip flexion, abduction and adduction. The patient has a POS IRVIN to glutes. Negative FADIR Test. Hyperflexion causes pain in groin and deep gluteal region. Negative García Test. Negative resisted SLR test at 30 degrees of hip flexion ([facility]). Negative Piriformis Test. No SI joint instability. There is no pain with logrolling. The calf and thigh are soft and nontender bilaterally. The patient is grossly neurovascularly intact distally [de-identified] : 2 views of left hip were performed today and available for me to review. Results were discussed with the patient. They demonstrate no f/x, dislocation, or other deformity.  3 views of pelvis were performed today and available for me to review. Results were discussed with the patient. They demonstrate no f/x, dislocation, or other deformity.

## 2024-08-22 NOTE — ADDENDUM
[FreeTextEntry1] : Documented by Sarai Cruz acting as a scribe for Dr. Rivas and Alexis Denton PA-C on 08/22/2024. All medical record entries made by the Scribe were at my, Dr. Rivas, and Alexis Denton's, direction and personally dictated by me on 08/22/2024. I have reviewed the chart and agree that the record accurately reflects my personal performance of the history, physical exam, procedure and imaging.

## 2024-08-26 ENCOUNTER — OUTPATIENT (OUTPATIENT)
Dept: OUTPATIENT SERVICES | Facility: HOSPITAL | Age: 49
LOS: 1 days | End: 2024-08-26

## 2024-08-26 DIAGNOSIS — M25.852 OTHER SPECIFIED JOINT DISORDERS, LEFT HIP: ICD-10-CM

## 2024-09-03 ENCOUNTER — OUTPATIENT (OUTPATIENT)
Dept: OUTPATIENT SERVICES | Facility: HOSPITAL | Age: 49
LOS: 1 days | End: 2024-09-03
Payer: COMMERCIAL

## 2024-09-03 ENCOUNTER — APPOINTMENT (OUTPATIENT)
Dept: MRI IMAGING | Facility: CLINIC | Age: 49
End: 2024-09-03

## 2024-09-03 DIAGNOSIS — M25.852 OTHER SPECIFIED JOINT DISORDERS, LEFT HIP: ICD-10-CM

## 2024-09-03 DIAGNOSIS — M70.62 TROCHANTERIC BURSITIS, LEFT HIP: ICD-10-CM

## 2024-09-03 PROCEDURE — 73721 MRI JNT OF LWR EXTRE W/O DYE: CPT

## 2024-09-03 PROCEDURE — 73721 MRI JNT OF LWR EXTRE W/O DYE: CPT | Mod: 26,LT

## 2024-09-12 ENCOUNTER — APPOINTMENT (OUTPATIENT)
Dept: ORTHOPEDIC SURGERY | Facility: CLINIC | Age: 49
End: 2024-09-12

## 2024-09-17 ENCOUNTER — APPOINTMENT (OUTPATIENT)
Age: 49
End: 2024-09-17

## 2024-09-17 VITALS
SYSTOLIC BLOOD PRESSURE: 125 MMHG | BODY MASS INDEX: 20.14 KG/M2 | WEIGHT: 118 LBS | DIASTOLIC BLOOD PRESSURE: 78 MMHG | HEART RATE: 54 BPM | HEIGHT: 64 IN

## 2024-09-17 DIAGNOSIS — M67.449 GANGLION, UNSPECIFIED HAND: ICD-10-CM

## 2024-09-17 PROCEDURE — 73130 X-RAY EXAM OF HAND: CPT | Mod: LT

## 2024-09-17 PROCEDURE — 99213 OFFICE O/P EST LOW 20 MIN: CPT

## 2024-09-17 NOTE — ASSESSMENT
[FreeTextEntry1] : 49-year-old female presents with a left small finger soft tissue mass  -Given the chronicity, as well as painful nature she would like to have the mass removed.  I do not believe it will self resolve given the timeline.  The soft tissue masses is of unclear etiology however has properties of a ganglion most likely.  Will obtain an MRI to evaluate if the cyst/mass originates from the joint versus tendon versus soft tissue.  She will follow-up if the MRI is obtained for further discussion regarding surgical intervention.  We did discuss the surgery and associated risks benefits and alternatives.  We discussed how there will be a scar.  We discussed chance of recurrence.  We discussed other risk such as infection damage to surrounding tissues.  She verbalized understanding and believes she would like to proceed.  Will see her back after the MRI is obtained.  All questions answered to the best of my ability

## 2024-09-17 NOTE — HISTORY OF PRESENT ILLNESS
[FreeTextEntry1] : 49-year-old female presenting with a left middle finger soft tissue mass.  She states it began to occur approximately 6 months ago.  She states it is painful to palpation, and when she accidentally bumps it on objects.  She denies history of prior trauma to this finger.  She is otherwise healthy.  No history of diabetes.  No other masses.  She is an avid runner and participates in marathons

## 2024-09-17 NOTE — PHYSICAL EXAM
[de-identified] : Left middle finger -Over the dorsal aspect near the PIP joint there is a soft tissue mass.  It is deep to the skin, difficult to discern if is from the tendon versus the joint.  It is slightly mobile.  There were no overlying skin changes abrasions redness or erythema.  She has full range of motion.  She is able to make a full composite fist and full extension of all digits.  Digit warm well-perfused distally with intact sensation. [de-identified] : Three-view x-ray of the left hand including AP lateral bleak, these are taken today and personally reviewed.  These demonstrate no bony abnormalities, no arthritic change of the digits.  You can see on the lateral of the middle finger there is a dorsally protruding soft tissue mass without radiopaque properties

## 2024-09-19 ENCOUNTER — APPOINTMENT (OUTPATIENT)
Dept: ORTHOPEDIC SURGERY | Facility: CLINIC | Age: 49
End: 2024-09-19
Payer: COMMERCIAL

## 2024-09-19 PROCEDURE — 99442: CPT

## 2024-09-26 DIAGNOSIS — Z82.62 FAMILY HISTORY OF OSTEOPOROSIS: ICD-10-CM

## 2024-09-26 DIAGNOSIS — Z82.61 FAMILY HISTORY OF ARTHRITIS: ICD-10-CM

## 2024-10-01 ENCOUNTER — APPOINTMENT (OUTPATIENT)
Dept: MRI IMAGING | Facility: CLINIC | Age: 49
End: 2024-10-01

## 2024-12-10 ENCOUNTER — APPOINTMENT (OUTPATIENT)
Dept: BREAST CENTER | Facility: CLINIC | Age: 49
End: 2024-12-10

## 2025-01-02 NOTE — ED ADULT TRIAGE NOTE - MODE OF ARRIVAL
Key: [id number]    Rexulti 0.5MG tablets    PA started    Information regarding your request  Authorization already on file for this request. Authorization starting on 01/01/2025 and ending on 12/31/2025.   Walk in